# Patient Record
Sex: MALE | Race: WHITE | NOT HISPANIC OR LATINO | Employment: FULL TIME | URBAN - METROPOLITAN AREA
[De-identification: names, ages, dates, MRNs, and addresses within clinical notes are randomized per-mention and may not be internally consistent; named-entity substitution may affect disease eponyms.]

---

## 2017-03-28 ENCOUNTER — OFFICE VISIT (OUTPATIENT)
Dept: SPORTS MEDICINE | Facility: CLINIC | Age: 47
End: 2017-03-28

## 2017-03-28 VITALS
WEIGHT: 231 LBS | HEART RATE: 56 BPM | DIASTOLIC BLOOD PRESSURE: 72 MMHG | HEIGHT: 74 IN | BODY MASS INDEX: 29.65 KG/M2 | OXYGEN SATURATION: 98 % | SYSTOLIC BLOOD PRESSURE: 118 MMHG

## 2017-03-28 DIAGNOSIS — F41.9 ANXIETY: Primary | ICD-10-CM

## 2017-03-28 PROCEDURE — 99212 OFFICE O/P EST SF 10 MIN: CPT | Performed by: FAMILY MEDICINE

## 2017-03-28 RX ORDER — CITALOPRAM 40 MG/1
40 TABLET ORAL DAILY
Qty: 30 TABLET | Refills: 2 | Status: SHIPPED | OUTPATIENT
Start: 2017-03-28 | End: 2017-06-30 | Stop reason: SDUPTHER

## 2017-03-28 NOTE — PROGRESS NOTES
"Rickey is a 47 y.o. year old male    Chief Complaint   Patient presents with   • Anxiety     Pt is here to f/u       History of Present Illness   HPI   Here to f/u on anxiety. Celexa is helping but caused some somnolence and sexual dysfunction. Has helped but not enough to be really therapeutic. Continues with generalized anxiety, difficulty responding to stressful situations.     Only used a few buspar    Notes bad SE with wellbutrin in past    I have reviewed the patient's medical history in detail and updated the computerized patient record.    Review of Systems   Constitutional: Negative.        /72  Pulse 56  Ht 74\" (188 cm)  Wt 231 lb (105 kg)  SpO2 98%  BMI 29.66 kg/m2     Physical Exam   Constitutional: He appears well-developed and well-nourished.   Pulmonary/Chest: Effort normal.   Psychiatric: He has a normal mood and affect. His behavior is normal. Judgment and thought content normal.   Vitals reviewed.       Diagnoses and all orders for this visit:    Anxiety  -     citalopram (CeleXA) 40 MG tablet; Take 1 tablet by mouth Daily.  Increase to 40mg. F/u in phone or electronically for status update in 3-4 weeks. If not improving or worsened SE will change, consider SNRI.   "

## 2017-04-28 ENCOUNTER — OFFICE VISIT (OUTPATIENT)
Dept: SPORTS MEDICINE | Facility: CLINIC | Age: 47
End: 2017-04-28

## 2017-04-28 VITALS
DIASTOLIC BLOOD PRESSURE: 74 MMHG | BODY MASS INDEX: 29.65 KG/M2 | OXYGEN SATURATION: 99 % | SYSTOLIC BLOOD PRESSURE: 116 MMHG | HEIGHT: 74 IN | WEIGHT: 231 LBS | HEART RATE: 57 BPM

## 2017-04-28 DIAGNOSIS — L08.9 SKIN INFECTION: ICD-10-CM

## 2017-04-28 DIAGNOSIS — L30.9 DERMATITIS: Primary | ICD-10-CM

## 2017-04-28 DIAGNOSIS — L08.89 PITTED KERATOLYSIS: ICD-10-CM

## 2017-04-28 PROCEDURE — 99214 OFFICE O/P EST MOD 30 MIN: CPT | Performed by: FAMILY MEDICINE

## 2017-04-28 RX ORDER — HYDROXYZINE HYDROCHLORIDE 10 MG/1
10 TABLET, FILM COATED ORAL 3 TIMES DAILY PRN
Qty: 30 TABLET | Refills: 0 | Status: SHIPPED | OUTPATIENT
Start: 2017-04-28 | End: 2017-05-15 | Stop reason: SDUPTHER

## 2017-04-28 RX ORDER — PREDNISONE 10 MG/1
TABLET ORAL
Qty: 48 TABLET | Refills: 0 | Status: SHIPPED | OUTPATIENT
Start: 2017-04-28 | End: 2017-10-04

## 2017-04-28 RX ORDER — ERYTHROMYCIN 333 MG/1
333 TABLET, DELAYED RELEASE ORAL 4 TIMES DAILY
Qty: 30 TABLET | Refills: 0 | Status: CANCELLED | OUTPATIENT
Start: 2017-04-28

## 2017-04-28 RX ORDER — ERYTHROMYCIN 20 MG/ML
SOLUTION TOPICAL 2 TIMES DAILY
Qty: 60 ML | Refills: 1 | Status: SHIPPED | OUTPATIENT
Start: 2017-04-28 | End: 2017-10-04

## 2017-04-28 NOTE — PROGRESS NOTES
"Jcarlos Bedoya is a 47 y.o. male.   Chief Complaint   Patient presents with   • Right Foot - Blister, Pain   • Left Foot - Blister, Pain       History of Present Illness   2 weeks ago patient noted very pruritic rash on the palms of his hands, patient has an allergy to dogs of which he has for at home.  He is receiving allergy injections now.  The pruritic rash continued on his palms and then began to note an area on both feet on the sole of his feet in the metatarsal region a very red raised pruritic lesion bilaterally.  This has progressed to involve the entire sole of his feet.  Also has had bullous lesions on his feet.  The main complaint of the rash is being very pruritic and both the hands and the feet however is now on again to get blisters on the soles of his feet.  No fever or chills.  Patient has been using topical antifungals, powders, Neosporin and \"nothing is helping\".      I have reviewed the patient's medical history in detail and updated the computerized patient record.        Review of Systems   Constitutional: Negative.    HENT: Negative.    Respiratory: Negative.    Cardiovascular: Negative.    Gastrointestinal: Negative.    Skin: Positive for rash.     /74  Pulse 57  Ht 74\" (188 cm)  Wt 231 lb (105 kg)  SpO2 99%  BMI 29.66 kg/m2    Objective   Physical Exam   Constitutional: He is oriented to person, place, and time. He appears well-developed and well-nourished.   HENT:   Head: Normocephalic and atraumatic.   Eyes: Conjunctivae and EOM are normal. Pupils are equal, round, and reactive to light.   Cardiovascular:   No peripheral edema   Pulmonary/Chest: Effort normal.   Neurological: He is alert and oriented to person, place, and time.   Skin: Skin is warm and dry.   Both palms with evidence of small raised red papules, there is no bullous lesions.  No evidence of secondary infection.  Right foot with similar findings to the palm however there is some pitted keratolysis " present as well, no bullous lesions on the right foot.  Left foot seems more involved and there are several bullous lesions of clear fluid around the toes.   Psychiatric: He has a normal mood and affect. His behavior is normal.   Vitals reviewed.      Assessment/Plan   Rickey was seen today for blister, pain, blister and pain.    Diagnoses and all orders for this visit:    Dermatitis  -     predniSONE (DELTASONE) 10 MG tablet; 6 tabs qd x 3 d, 4 tabs qd x 3 d, 3 tabs qd x 3 d, 2 tabs qd x 3 d,1 tab qd x 3 d  -     hydrOXYzine (ATARAX) 10 MG tablet; Take 1 tablet by mouth 3 (Three) Times a Day As Needed for Itching.    Skin infection  -     predniSONE (DELTASONE) 10 MG tablet; 6 tabs qd x 3 d, 4 tabs qd x 3 d, 3 tabs qd x 3 d, 2 tabs qd x 3 d,1 tab qd x 3 d  -     hydrOXYzine (ATARAX) 10 MG tablet; Take 1 tablet by mouth 3 (Three) Times a Day As Needed for Itching.    Pitted keratolysis  -     erythromycin with ethanol (THERAMYCIN) 2 % external solution; Apply  topically 2 (Two) Times a Day.    Other orders  -     Cancel: erythromycin (WALI-TAB) 333 MG EC tablet; Take 1 tablet by mouth 4 (Four) Times a Day.      I believe this is something that started as a contact dermatitis on his hands that eventually spread to the soles of his feet however the plantar surfaces now appear to have either a bullous dermatophyte are.  Care to lysis.  He has been treating with topical antifungals without success.  We will therefore treat the contact dermatitis with tapering prednisone as well as hydroxyzine for the itching.  We'll have him apply topical erythromycin (oral erythromycin would not be indicated with him being on Celexa).  If he is not seeing any improvement over the next 3-4  days then will refer to dermatology.    25 minutes were spent face-to-face with the patient with greater than 50% of that time spent counseling the patient.

## 2017-05-15 DIAGNOSIS — L08.89 PITTED KERATOLYSIS: ICD-10-CM

## 2017-05-15 DIAGNOSIS — L30.9 DERMATITIS: Primary | ICD-10-CM

## 2017-05-15 DIAGNOSIS — L08.9 SKIN INFECTION: ICD-10-CM

## 2017-05-15 RX ORDER — HYDROXYZINE HYDROCHLORIDE 10 MG/1
10 TABLET, FILM COATED ORAL 3 TIMES DAILY PRN
Qty: 30 TABLET | Refills: 0 | Status: SHIPPED | OUTPATIENT
Start: 2017-05-15 | End: 2017-10-04

## 2017-06-30 ENCOUNTER — TELEPHONE (OUTPATIENT)
Dept: SPORTS MEDICINE | Facility: CLINIC | Age: 47
End: 2017-06-30

## 2017-06-30 DIAGNOSIS — F41.9 ANXIETY: ICD-10-CM

## 2017-06-30 RX ORDER — CITALOPRAM 40 MG/1
40 TABLET ORAL DAILY
Qty: 30 TABLET | Refills: 3 | Status: SHIPPED | OUTPATIENT
Start: 2017-06-30 | End: 2017-10-04

## 2017-10-04 ENCOUNTER — OFFICE VISIT (OUTPATIENT)
Dept: SPORTS MEDICINE | Facility: CLINIC | Age: 47
End: 2017-10-04

## 2017-10-04 VITALS
WEIGHT: 264 LBS | SYSTOLIC BLOOD PRESSURE: 124 MMHG | OXYGEN SATURATION: 98 % | HEIGHT: 74 IN | DIASTOLIC BLOOD PRESSURE: 76 MMHG | HEART RATE: 52 BPM | BODY MASS INDEX: 33.88 KG/M2

## 2017-10-04 DIAGNOSIS — R42 VERTIGO: Primary | ICD-10-CM

## 2017-10-04 DIAGNOSIS — R51.9 NONINTRACTABLE HEADACHE, UNSPECIFIED CHRONICITY PATTERN, UNSPECIFIED HEADACHE TYPE: ICD-10-CM

## 2017-10-04 DIAGNOSIS — H61.23 BILATERAL IMPACTED CERUMEN: ICD-10-CM

## 2017-10-04 PROCEDURE — 99214 OFFICE O/P EST MOD 30 MIN: CPT | Performed by: FAMILY MEDICINE

## 2017-10-04 PROCEDURE — 69209 REMOVE IMPACTED EAR WAX UNI: CPT | Performed by: FAMILY MEDICINE

## 2017-10-04 NOTE — PROGRESS NOTES
"Rickey is a 47 y.o. year old male    Chief Complaint   Patient presents with   • Pressure Behind the Eyes     Pt sts he can turn his head and get  dizzy    • Headache   • Fatigue       History of Present Illness   HPI   Here today for vague discomfort in the head, intermittent, started about 2 weeks ago.  No inciting event.  Notes a generalized pressure behind the eyes, also sometimes associated with dizzy feelings that resolved spontaneously.  He also has odd \"zinger\" sensations to the arms and legs that come and go.      After this started he stopped his Celexa abruptly without tapering, but it did not change his symptoms.  Stopped because he felt like it was not helping his anxiety, but BuSpar is still helpful as needed.      I have reviewed the patient's medical, family, and social history in detail and updated the computerized patient record.    Review of Systems   Constitutional: Negative for chills and fever.   HENT: Negative for congestion and tinnitus.    Eyes: Negative for photophobia and visual disturbance.   Respiratory: Negative.    Neurological: Positive for dizziness.       /76  Pulse 52  Ht 74\" (188 cm)  Wt 264 lb (120 kg)  SpO2 98%  BMI 33.9 kg/m2     Physical Exam   Constitutional: He is oriented to person, place, and time. He appears well-developed and well-nourished.   HENT:   Head: Normocephalic and atraumatic.   Right Ear: External ear normal.   Left Ear: External ear normal.   Mouth/Throat: Oropharynx is clear and moist. No oropharyngeal exudate.   Cerumen impaction obstructing visualization of the tympanic membrane and middle ear   Eyes: Conjunctivae are normal. Pupils are equal, round, and reactive to light.   Neck: Normal range of motion. Neck supple. No thyromegaly present.   Cardiovascular: Normal rate, regular rhythm and normal heart sounds.    Pulmonary/Chest: Effort normal and breath sounds normal.   Lymphadenopathy:     He has no cervical adenopathy.   Neurological: He is " alert and oriented to person, place, and time. No cranial nerve deficit. He exhibits normal muscle tone. Coordination normal.   Negative Weiser-Hallpike maneuver   Skin: Skin is warm and dry.   Psychiatric: He has a normal mood and affect. His behavior is normal. Judgment and thought content normal.   Vitals reviewed.    bilateral ear(s) irrigated by medical assistant due to impacted cerumen in the ear canal. This was indicated due to obstruction of visualization for complete exam.  After irrigation, bilateral tympanic membranes were easily visualized and normal.     Diagnoses and all orders for this visit:    Vertigo  -     Ambulatory Referral to Physical Therapy Vestibular    Bilateral impacted cerumen    Nonintractable headache, unspecified chronicity pattern, unspecified headache type    Underlying root cause here is rather unclear.  This could be intermittent vertigo or a typical migraine pattern.  We'll start with vestibular therapy to try to clarify the pattern, will determine next steps in evaluation and management based on his response there.    EMR Dragon/Transcription disclaimer:    Much of this encounter note is an electronic transcription/translation of spoken language to printed text.  The electronic translation of spoken language may permit erroneous, or at times, nonsensical words or phrases to be inadvertently transcribed.  Although I have reviewed the note for such errors some may still exist.

## 2018-01-10 DIAGNOSIS — R91.8 ABNORMAL CT SCAN OF LUNG: Primary | ICD-10-CM

## 2018-01-11 ENCOUNTER — TELEPHONE (OUTPATIENT)
Dept: SPORTS MEDICINE | Facility: CLINIC | Age: 48
End: 2018-01-11

## 2018-01-11 NOTE — TELEPHONE ENCOUNTER
He can decline it if he wants; last year we did a scan and the radiologist recommended a follow up on what appeared to be a scar to make sure that's all it is. He can wait until his next physical to discuss if he wants.

## 2018-01-13 ENCOUNTER — APPOINTMENT (OUTPATIENT)
Dept: GENERAL RADIOLOGY | Facility: HOSPITAL | Age: 48
End: 2018-01-13

## 2018-01-13 PROCEDURE — 72100 X-RAY EXAM L-S SPINE 2/3 VWS: CPT | Performed by: FAMILY MEDICINE

## 2018-01-13 PROCEDURE — 72220 X-RAY EXAM SACRUM TAILBONE: CPT | Performed by: FAMILY MEDICINE

## 2018-01-13 PROCEDURE — 71101 X-RAY EXAM UNILAT RIBS/CHEST: CPT | Performed by: FAMILY MEDICINE

## 2018-02-13 ENCOUNTER — TELEPHONE (OUTPATIENT)
Dept: ORTHOPEDIC SURGERY | Facility: CLINIC | Age: 48
End: 2018-02-13

## 2018-03-05 ENCOUNTER — OFFICE VISIT (OUTPATIENT)
Dept: ORTHOPEDIC SURGERY | Facility: CLINIC | Age: 48
End: 2018-03-05

## 2018-03-05 VITALS — TEMPERATURE: 97.7 F | BODY MASS INDEX: 33.11 KG/M2 | HEIGHT: 74 IN | WEIGHT: 258 LBS

## 2018-03-05 DIAGNOSIS — M79.671 FOOT PAIN, RIGHT: Primary | ICD-10-CM

## 2018-03-05 DIAGNOSIS — R22.41 MASS OF RIGHT FOOT: ICD-10-CM

## 2018-03-05 PROCEDURE — 73630 X-RAY EXAM OF FOOT: CPT | Performed by: ORTHOPAEDIC SURGERY

## 2018-03-05 PROCEDURE — 99203 OFFICE O/P NEW LOW 30 MIN: CPT | Performed by: ORTHOPAEDIC SURGERY

## 2018-03-05 NOTE — PROGRESS NOTES
"Patient:  Rickey Bedoya is a 48 y.o. male    Chief Complaint/ Reason for Visit:    Chief Complaint   Patient presents with   • Right Foot - Establish Care, Pain, Mass       HPI:  This pleasant gentleman presents today complaining of a 4 month history of pain associated with a mass in the plantar aspect of his right forefoot.  He says that he was being treated for \"dystrophic eczema\" by Dr. Jason Singh, his dermatologist, and he was developing recurrent severe blistering of both of his feet.  He says that in late August or early November of last year, he had tremendous swelling in both feet associated with the skin problems.  The right foot swelled more than the left.  When the swelling went down, he noticed this \"knot\" beneath his right forefoot adjacent to the second metatarsal head.  He says he in the ensuing months, he has noted a divergence between his right second and third toes that he associates with the appearance of this \"knot\".    The knot is painful and tender to walk on.  The pain is aching occasionally sharp.  The pain is typically mild.  It's alleviated by rest and elevation.  He has not had any calf pain, chest pain, or shortness of breath.      PMH:  History reviewed. No pertinent past medical history.    PSH:    Past Surgical History:   Procedure Laterality Date   • APPENDECTOMY         Social Hx:    Social History     Social History   • Marital status:      Spouse name: N/A   • Number of children: N/A   • Years of education: N/A     Occupational History   • Not on file.     Social History Main Topics   • Smoking status: Never Smoker   • Smokeless tobacco: Never Used   • Alcohol use No   • Drug use: Defer   • Sexual activity: Defer     Other Topics Concern   • Not on file     Social History Narrative       Family Hx:    Family History   Problem Relation Age of Onset   • COPD Mother    • Heart failure Mother    • Diabetes Father    • Heart failure Father    • Seizures Father    • No Known " "Problems Son    • No Known Problems Daughter        Meds:    Current Outpatient Prescriptions:   •  albuterol (PROVENTIL HFA;VENTOLIN HFA) 108 (90 BASE) MCG/ACT inhaler, Inhale 2 puffs Every 4 (Four) Hours As Needed for wheezing., Disp: 1 inhaler, Rfl: 2  •  busPIRone (BUSPAR) 10 MG tablet, Take 1 tablet by mouth 2 (Two) Times a Day. Prn anxiety, Disp: 30 tablet, Rfl: 1  •  diclofenac (VOLTAREN) 50 MG EC tablet, Take 1 tablet by mouth 2 (Two) Times a Day As Needed (pain, take with food)., Disp: 30 tablet, Rfl: 0  •  HYDROcod Polst-CPM Polst ER (TUSSIONEX PENNKINETIC ER) 10-8 MG/5ML ER suspension, Take 5 mL by mouth Every 12 (Twelve) Hours As Needed for Cough., Disp: 115 mL, Rfl: 0  •  tiZANidine (ZANAFLEX) 4 MG tablet, Take 1 tablet by mouth Every 8 (Eight) Hours As Needed for Muscle Spasms., Disp: 30 tablet, Rfl: 0    Allergies:  No Known Allergies    ROS:  Review of Systems   Musculoskeletal: Positive for myalgias.   All other systems reviewed and are negative.      Vitals:    03/05/18 0831   Temp: 97.7 °F (36.5 °C)   TempSrc: Temporal Artery    Weight: 117 kg (258 lb)   Height: 186.7 cm (73.5\")     Body mass index is 33.58 kg/(m^2).    Physical Exam    The patient is awake, alert, and oriented ×3.  The patient is in no acute distress.  Breathing is regular and unlabored with a respiratory rate of 14/m.  Extraocular movements and pupillary responses are symmetrically intact. Sclerae are anicteric.   Hearing is within normal limits.  Speech is within normal limits.  There is no jugular venous distention.    The patient has a fairly smooth, symmetrical heel toe gait with perhaps just the slightest antalgia from the right.  Shortening of the stance phase is noted.    Right foot: There is no obvious atrophy of the right lower extremity musculature.  The right calf is soft and nontender with no venous cord.  There is no cellulitis, no lymphangitis, and no popliteal lymphadenopathy.  The patient has palpable regular " pedal pulses with a current heart rate of about 72 bpm.  When he stands, there is a noticeable divergence between the right second and third toes that is not present in the left foot.  Justin's click is negative.  Sensory exam is intact with perhaps slightly decreased to light touch in the right forefoot second webspace.  Capillary filling is brisk in all toes.  Active flexion and extension of the toes is intact.  The patient has no weakness or muscular atrophy that I can detect on examination at this time.        Radiology:X-rays: 3 views of the patient's right foot were ordered and reviewed today to assess his complaints of a tender painful mass and swelling in his right forefoot.  Comparison images were not available.  These images do not reveal any obvious acute osseous or articular abnormalities.  On the AP view, I do see what may be a very subtle soft tissue density plantar to the second metatarsal head.  Incidental note is made of a mild to moderate hallux valgus deformity.        Assessment:     Diagnosis Plan   1. Foot pain, right  XR Foot 3+ View Right    MRI Foot Right Without Contrast   2. Mass of right foot  MRI Foot Right Without Contrast           Plan:  I discussed everything with the patient at length.  I explained that there was no way of knowing if there was actually any relationship between the macerated develop in his foot and knee dermatologic disorder from which she had been suffering.  However, we need to obtain an MRI promptly to evaluate the nature of this mass.  Patient voiced understanding and agreement, and I instructed him to make a follow-up appointment within 3-5 days of the date of his MRI, as soon as he knew this date.  He voiced understanding and agreement.      Orders Placed This Encounter   Procedures   • XR Foot 3+ View Right     Order Specific Question:   Reason for Exam:     Answer:   iov rt. foot   • MRI Foot Right Without Contrast     Standing Status:   Future     Standing  Expiration Date:   3/5/2019

## 2018-03-05 NOTE — PATIENT INSTRUCTIONS
Please remember to schedule an appointment with me as soon as you know the date of your MRI.  This appointment should be 3-5 working days after the date of your MRI.  Thank you

## 2018-03-15 ENCOUNTER — HOSPITAL ENCOUNTER (OUTPATIENT)
Dept: MRI IMAGING | Facility: HOSPITAL | Age: 48
Discharge: HOME | End: 2018-03-15
Attending: ORTHOPAEDIC SURGERY | Admitting: ORTHOPAEDIC SURGERY

## 2018-03-15 DIAGNOSIS — M79.671 FOOT PAIN, RIGHT: ICD-10-CM

## 2018-03-15 DIAGNOSIS — R22.41 MASS OF RIGHT FOOT: ICD-10-CM

## 2018-03-15 PROCEDURE — 73718 MRI LOWER EXTREMITY W/O DYE: CPT

## 2018-03-20 ENCOUNTER — OFFICE VISIT (OUTPATIENT)
Dept: ORTHOPEDIC SURGERY | Facility: CLINIC | Age: 48
End: 2018-03-20

## 2018-03-20 VITALS — TEMPERATURE: 98.6 F | WEIGHT: 255 LBS | BODY MASS INDEX: 32.73 KG/M2 | HEIGHT: 74 IN

## 2018-03-20 DIAGNOSIS — R22.41 MASS OF RIGHT FOOT: ICD-10-CM

## 2018-03-20 DIAGNOSIS — M79.671 FOOT PAIN, RIGHT: ICD-10-CM

## 2018-03-20 DIAGNOSIS — M71.371 OTHER BURSAL CYST, RIGHT ANKLE AND FOOT: Primary | ICD-10-CM

## 2018-03-20 PROCEDURE — 99214 OFFICE O/P EST MOD 30 MIN: CPT | Performed by: ORTHOPAEDIC SURGERY

## 2018-03-20 NOTE — PROGRESS NOTES
"Patient:  Rickey Bedoya is a 48 y.o. male    Chief Complaint/ Reason for Visit:    Chief Complaint   Patient presents with   • Right Foot - Follow-up, Results, Pain       HPI:  Patient returns today and is unchanged with respect to his right foot.  He has pain in the plantar aspect of the forefoot and pain radiating out into his second and third toes which have been diverging over the past few months due to what is believed to be a mass effect.  He also has numbness after prolonged standing and walking.  He says on the one hand it's not anything that he can't live with, but on the other hand, he would rather not live with that if he doesn't have to.  He says that if he wants to walk his dog for a mile, he will have considerable discomfort and numbness and dysesthesias in his right forefoot, especially in the second and third toes.  However, for the most part, his history of present illness has not changed from the previous visit.  Therefore, for review, this is summarized below:     This pleasant gentleman presents today complaining of a 4 month history of pain associated with a mass in the plantar aspect of his right forefoot.  He says that he was being treated for \"dystrophic eczema\" by Dr. Jason Singh, his dermatologist, and he was developing recurrent severe blistering of both of his feet.  He says that in late August or early November of last year, he had tremendous swelling in both feet associated with the skin problems.  The right foot swelled more than the left.  When the swelling went down, he noticed this \"knot\" beneath his right forefoot adjacent to the second metatarsal head.  He says he in the ensuing months, he has noted a divergence between his right second and third toes that he associates with the appearance of this \"knot\".     The knot is painful and tender to walk on.  The pain is aching occasionally sharp.  The pain is typically mild.  It's alleviated by rest and elevation.  He has not had any " "calf pain, chest pain, or shortness of breath.         PMH:  History reviewed. No pertinent past medical history.    PSH:    Past Surgical History:   Procedure Laterality Date   • APPENDECTOMY         Social Hx:    Social History     Social History   • Marital status:      Spouse name: N/A   • Number of children: N/A   • Years of education: N/A     Occupational History   • Not on file.     Social History Main Topics   • Smoking status: Never Smoker   • Smokeless tobacco: Never Used   • Alcohol use No   • Drug use: Unknown   • Sexual activity: Defer     Other Topics Concern   • Not on file     Social History Narrative   • No narrative on file       Family Hx:    Family History   Problem Relation Age of Onset   • COPD Mother    • Heart failure Mother    • Diabetes Father    • Heart failure Father    • Seizures Father    • No Known Problems Son    • No Known Problems Daughter        Meds:  No current outpatient prescriptions on file.    Allergies:  No Known Allergies    ROS:  Review of Systems   Musculoskeletal: Positive for arthralgias, gait problem and joint swelling.   All other systems reviewed and are negative.      Vitals:    03/20/18 0820   Temp: 98.6 °F (37 °C)   TempSrc: Temporal Artery    Weight: 116 kg (255 lb)   Height: 186.7 cm (73.5\")     Body mass index is 33.19 kg/m².    Physical Exam    The patient is awake, alert, and oriented ×3.  The patient is in no acute distress.  Breathing is regular and unlabored with a respiratory rate of 12/m.  Extraocular movements and pupillary responses are symmetrically intact. Sclerae are anicteric.   Hearing is within normal limits.  Speech is within normal limits.  There is no jugular venous distention.    The patient has a fairly smooth, symmetrical heel toe gait, however I do notice that he has some antalgia from the right.  Shortening of the stance phase is noted.     Right foot: There is no obvious atrophy of the right lower extremity musculature.  The right " calf is soft and nontender with no venous cord.  There is no cellulitis, no lymphangitis, and no popliteal lymphadenopathy.  The patient has palpable regular pedal pulses with a current heart rate of about 72 bpm.  When he stands, there is a noticeable divergence between the right second and third toes that is not present in the left foot.  Justin's click is negative.  Sensory exam is intact with perhaps slightly decreased to light touch in the right forefoot second webspace.  Capillary filling is brisk in all toes.  Active flexion and extension of the toes is intact.  The patient has no weakness or muscular atrophy that I can detect on examination at this time.  When I push on the plantar masslike area beneath the second metatarsal head, it actually seems to increase the swelling and fullness between the second and third toes.  As I examined this area, the patient reported that he was developing more of a numb feeling between the second and third toes.        Radiology: MRI right foot: I reviewed the MRI from March 15, both the images and the report.  My findings are essentially the same as those of the interpreting radiologist.  The patient has an unusual and diffuse cyst that seems to travel towards the first metatarsal from the plantar aspect of the second metatarsal phalangeal joint and also travels laterally up into the second interspace likely causing the divergence of the second and third toes.          Assessment:     Diagnosis Plan   1. Other bursal cyst, right ankle and foot  Ambulatory Referral to Orthopedic Surgery   2. Mass of right foot  Ambulatory Referral to Orthopedic Surgery   3. Foot pain, right  Ambulatory Referral to Orthopedic Surgery           Plan:  I discussed everything with the patient at length, and in great detail.  I explained my concerns regarding this unusual cyst.  I explained that there was no way that I could tell him whether there was any direct association with the dermatologic  disorder that he had previously, but that I doubted the acute situation on the surface of his skin caused this cystic situation in his right forefoot, no matter how unusual they most may be.  However, I did explain that, at some level, he may have a connective tissue disorder, and inflammatory disorder, or some other systemic disorder that would be the basis for both the skin problems as well as this cyst.  I explained that incisions should essentially never be made on the bottom of the foot.  I explained that, therefore, complete excision of this cyst from a dorsal approach would not be possible.  I explained that cysts in the foot had at least a 20%, or one and 5, recurrence rate, even when completely surgically excised.  I also explained that I felt we should probably consider excising the portion in the second webspace that was causing the divergence of his toes.  I explained that this may or may not improve his overall symptomatology.  I explained that by opening the cyst up, it may result in draining and ablation of the cyst long-term.  I also explained that we could certainly take a noninvasive path of observation.  Given the unusual nature of the cyst, however, I think that partial excision would also function as an open biopsy.    We also discussed the possibility of percutaneously aspirating the cyst, though my confidence is not great that this will likely be successful or very helpful given the unusual nature of these circumstances.    I think that it would be worthwhile to obtain an independent evaluation/second opinion from Dr. Lemon.  The patient was agreeable.    I will see him back once he has received Dr. Lemon's assessment and recommendations.    I spent 25 minutes, at least, in the evaluation and management of this patient today, including review of the MRI images, as well as the report, personally.  I spent 15 minutes with the patient explaining the findings, and discussing the options  and going over her decision-making process with him.      Orders Placed This Encounter   Procedures   • Ambulatory Referral to Orthopedic Surgery     Referral Priority:   Routine     Referral Type:   Consultation     Referral Reason:   Second Opinion     Referred to Provider:   Dakota Lemon MD     Requested Specialty:   Orthopedic Surgery     Number of Visits Requested:   1

## 2018-04-11 ENCOUNTER — CONSULT (OUTPATIENT)
Dept: ORTHOPEDIC SURGERY | Facility: CLINIC | Age: 48
End: 2018-04-11

## 2018-04-11 ENCOUNTER — TELEPHONE (OUTPATIENT)
Dept: ORTHOPEDIC SURGERY | Facility: CLINIC | Age: 48
End: 2018-04-11

## 2018-04-11 VITALS — HEIGHT: 74 IN | WEIGHT: 252.4 LBS | TEMPERATURE: 97.6 F | BODY MASS INDEX: 32.39 KG/M2

## 2018-04-11 DIAGNOSIS — R22.41 MASS OF RIGHT FOOT: ICD-10-CM

## 2018-04-11 DIAGNOSIS — M71.371 OTHER BURSAL CYST, RIGHT ANKLE AND FOOT: Primary | ICD-10-CM

## 2018-04-11 DIAGNOSIS — M79.671 FOOT PAIN, RIGHT: ICD-10-CM

## 2018-04-11 DIAGNOSIS — R22.41 MASS OF RIGHT FOOT: Primary | ICD-10-CM

## 2018-04-11 PROCEDURE — 99244 OFF/OP CNSLTJ NEW/EST MOD 40: CPT | Performed by: ORTHOPAEDIC SURGERY

## 2018-04-11 NOTE — TELEPHONE ENCOUNTER
I called the patient to discuss his visit with Dr. Lemon, and discussed ongoing options for treatment of the mass in his foot.  I spent 8-1/2 minutes on the phone with the patient.    Ultimately, he agreed that he would like an opinion and possibly treatment from Dr. Shawn Perla.  He understands that Dr. Peral is not in the [facility] system, but we also reviewed that there really was no other musculoskeletal tumor specialist in the area, and certainly not one directly affiliated with [facility].    I told him that our office would facilitate the arrangements, and that he need not keep the appointment with me next week.

## 2018-04-11 NOTE — PROGRESS NOTES
New Patient Complaint      Patient: Rickey Bedoya  YOB: 1970 48 y.o. male  Medical Record Number: [id number]    Chief Complaints: My foot hurts     History of Present Illness: Patient reports a 4-5 month history of painful mass under the right forefoot mainly beneath the second MTP joint.    He began treatment last April for dystrophic eczema, with multiple episodes where the plantar aspect of the foot but breakout and blister and then heel.  In the fall of last year he had tremendous swelling to the right foot that is now resolved and he noticed in February of this over this year that he had painful mass under the plantar aspect of the right forefoot with some splaying of the second and third toes.  Painful symptoms have escalated some since he saw Dr. Stoll.    He complains of significant increased moderate aching pain with any prolonged walking or barefoot ambulation.  He states that it is affecting the quality of his life and it is something that he would like to have something done with it if at all possible.    He is been followed by my partner Dr. Stoll for this who has requested my opinion regarding etiology and treatment of this condition.        HPI    Allergies: No Known Allergies    Medications:   No current outpatient prescriptions on file prior to visit.     No current facility-administered medications on file prior to visit.        History reviewed. No pertinent past medical history.  Past Surgical History:   Procedure Laterality Date   • APPENDECTOMY       Social History     Occupational History   • Not on file.     Social History Main Topics   • Smoking status: Never Smoker   • Smokeless tobacco: Never Used   • Alcohol use No   • Drug use: Unknown   • Sexual activity: Defer      Social History     Social History Narrative   • No narrative on file     Family History   Problem Relation Age of Onset   • COPD Mother    • Heart failure Mother    • Diabetes Father    • Heart failure  "Father    • Seizures Father    • No Known Problems Son    • No Known Problems Daughter        Review of Systems: 14 point review of systems performed, positive pertinent findings identified in HPI. All remaining systems negative     Review of Systems      Physical Exam:   Vitals:    04/11/18 0811   Temp: 97.6 °F (36.4 °C)   TempSrc: Temporal Artery    Weight: 114 kg (252 lb 6.4 oz)   Height: 188 cm (74\")     Physical Exam   Constitutional: pleasant, well developed   Eyes: sclera non icteric  Hearing : adequate for exam  Cardiovascular: palpable pulses in right foot, right calf/ thigh NT without sign of DVT  Respiratoy: breathing unlabored   Neurological: grossly sensate to LT throughout right LE  Psychiatric: oriented with normal mood and affect.   Lymphatic: No palpable popliteal lymphadenopathy right LE  Skin: intact throughout right leg/foot  Musculoskeletal: Right foot shows skin to be intact with no warmth or erythema.  There is some fullness plantar to the second metatarsal head which is somewhat tender to palpation.  With plantar applied pressure this does give some discomfort in the second more so than first webspace.  There was no gross irritability or instability to the second or third MTP joint.  On standing there was some splaying of the second and third toes.  I was unable to elicit any clear neuritic symptoms today in the second webspace  Physical Exam  Ortho Exam    Radiology: 3 views of the right foot were reviewed from 3/5/18 and do not see any evidence of any lytic lesion there is some slight splaying of the second and third toes.    MRI films and report of the right foot dated 3/15/18 reviewed which show a superficial cystic lesion plantar to the second MTP joint contiguous with a smaller cystic lesion within the second interspace also some projection plantar medially to the first interspace with some low T2 signal which may represent some internal debris and synovial thickening or hemorrhagic " material thought to be a synovial or ganglion cyst or possible adventitial bursitis        Assessment/Plan: Right foot plantar mass.    I discussed at length with patient that this is a very difficult problem difficult to say if it is directly related to his dermatologic condition that is certainly think is a high likelihood that it could've been related as the symptoms began after a lesion to that area I do not see any sign of infection at this time.    Full resection would be very difficult through a dorsal approach and I would be reluctant to perform any extensive plantar incision as I do not have significant experience with that.    Fitted him with a metatarsal pad today and we'll have him follow-up with Dr. Stoll.  Additional treatment recommendations and considerations could include possible attempted aspiration and steroid injection or possible referral to Dr. Shawn Perla who is a musculoskeletal tumor specialist and/or Dr. Carlos Eduardo Mack who is a foot and ankle colleagues may have different experience with such masses.  Patient voiced clear understanding of this treatment plan and will follow-up with Dr. Stoll

## 2019-06-28 ENCOUNTER — OFFICE VISIT (OUTPATIENT)
Dept: SPORTS MEDICINE | Facility: CLINIC | Age: 49
End: 2019-06-28

## 2019-06-28 VITALS
SYSTOLIC BLOOD PRESSURE: 138 MMHG | OXYGEN SATURATION: 95 % | HEART RATE: 92 BPM | WEIGHT: 250 LBS | DIASTOLIC BLOOD PRESSURE: 80 MMHG | BODY MASS INDEX: 32.08 KG/M2 | HEIGHT: 74 IN

## 2019-06-28 DIAGNOSIS — F41.9 ANXIETY: Primary | ICD-10-CM

## 2019-06-28 PROCEDURE — 99214 OFFICE O/P EST MOD 30 MIN: CPT | Performed by: FAMILY MEDICINE

## 2019-06-28 RX ORDER — CITALOPRAM 20 MG/1
20 TABLET ORAL DAILY
Qty: 30 TABLET | Refills: 1 | Status: SHIPPED | OUTPATIENT
Start: 2019-06-28 | End: 2019-08-02 | Stop reason: SDUPTHER

## 2019-06-28 RX ORDER — BUSPIRONE HYDROCHLORIDE 15 MG/1
15 TABLET ORAL 3 TIMES DAILY PRN
Qty: 30 TABLET | Refills: 1 | Status: SHIPPED | OUTPATIENT
Start: 2019-06-28 | End: 2019-08-02 | Stop reason: SDUPTHER

## 2019-06-28 NOTE — PROGRESS NOTES
"Rickey is a 49 y.o. year old male follow up on a problem familiar to this examiner.     Chief Complaint   Patient presents with   • Anxiety       History of Present Illness   HPI   F/u anxiety - since last visit in 2017 has been doing pretty well overall.   Celexa worked well but caused somnolence and sexual side effects.   Buspar worked well for intermittent episodes.   Notes over the past several months he recognizes increasing generalized anxiety symptoms. Notably losing his temper easily and feeling guilty afterwards. Associated with difficulty sleeping.     I have reviewed the patient's medical, family, and social history in detail and updated the computerized patient record.    Review of Systems   Constitutional: Negative.    Respiratory: Negative.    Cardiovascular: Negative.    Psychiatric/Behavioral: Positive for sleep disturbance. The patient is nervous/anxious.        /80 (BP Location: Left arm, Patient Position: Sitting, Cuff Size: Adult)   Pulse 92   Ht 188 cm (74.02\")   Wt 113 kg (250 lb)   SpO2 95%   BMI 32.08 kg/m²      Physical Exam   Constitutional: He is oriented to person, place, and time. He appears well-developed and well-nourished.   Pulmonary/Chest: Effort normal.   Neurological: He is alert and oriented to person, place, and time.   Psychiatric: He has a normal mood and affect. His behavior is normal.   Vitals reviewed.        Current Outpatient Medications:   •  fluticasone (FLONASE) 50 MCG/ACT nasal spray, Instill two sprays in each nostril daily., Disp: 16 g, Rfl: 0  •  busPIRone (BUSPAR) 15 MG tablet, Take 1 tablet by mouth 3 (Three) Times a Day As Needed (anxiety)., Disp: 30 tablet, Rfl: 1  •  citalopram (CELEXA) 20 MG tablet, Take 1 tablet by mouth Daily., Disp: 30 tablet, Rfl: 1     Diagnoses and all orders for this visit:    Anxiety  -     busPIRone (BUSPAR) 15 MG tablet; Take 1 tablet by mouth 3 (Three) Times a Day As Needed (anxiety).  -     citalopram (CELEXA) 20 MG " tablet; Take 1 tablet by mouth Daily.    Resume citalopram; may need to increase to 40mg. Update on progress in 4 weeks .         EMR Dragon/Transcription disclaimer:    Much of this encounter note is an electronic transcription/translation of spoken language to printed text.  The electronic translation of spoken language may permit erroneous, or at times, nonsensical words or phrases to be inadvertently transcribed.  Although I have reviewed the note for such errors some may still exist.

## 2019-08-02 DIAGNOSIS — F41.9 ANXIETY: ICD-10-CM

## 2019-08-02 RX ORDER — FLUTICASONE PROPIONATE 50 MCG
2 SPRAY, SUSPENSION (ML) NASAL DAILY
Qty: 1 BOTTLE | Refills: 11 | Status: SHIPPED | OUTPATIENT
Start: 2019-08-02 | End: 2020-04-02 | Stop reason: SDUPTHER

## 2019-08-02 RX ORDER — BUSPIRONE HYDROCHLORIDE 15 MG/1
15 TABLET ORAL 3 TIMES DAILY PRN
Qty: 30 TABLET | Refills: 1 | Status: SHIPPED | OUTPATIENT
Start: 2019-08-02

## 2019-08-02 RX ORDER — CITALOPRAM 20 MG/1
20 TABLET ORAL DAILY
Qty: 30 TABLET | Refills: 1 | Status: SHIPPED | OUTPATIENT
Start: 2019-08-02 | End: 2019-10-30 | Stop reason: SDUPTHER

## 2019-09-09 ENCOUNTER — OFFICE VISIT (OUTPATIENT)
Dept: SPORTS MEDICINE | Facility: CLINIC | Age: 49
End: 2019-09-09

## 2019-09-09 VITALS
WEIGHT: 250 LBS | HEIGHT: 74 IN | SYSTOLIC BLOOD PRESSURE: 124 MMHG | HEART RATE: 50 BPM | DIASTOLIC BLOOD PRESSURE: 64 MMHG | OXYGEN SATURATION: 97 % | BODY MASS INDEX: 32.08 KG/M2

## 2019-09-09 DIAGNOSIS — H53.9 VISUAL CHANGES: ICD-10-CM

## 2019-09-09 DIAGNOSIS — S81.802A LEG WOUND, LEFT, INITIAL ENCOUNTER: Primary | ICD-10-CM

## 2019-09-09 PROCEDURE — 99215 OFFICE O/P EST HI 40 MIN: CPT | Performed by: FAMILY MEDICINE

## 2019-09-09 PROCEDURE — 73590 X-RAY EXAM OF LOWER LEG: CPT | Performed by: FAMILY MEDICINE

## 2019-09-09 RX ORDER — CEPHALEXIN 500 MG/1
500 CAPSULE ORAL 2 TIMES DAILY
Qty: 20 CAPSULE | Refills: 0 | Status: SHIPPED | OUTPATIENT
Start: 2019-09-09 | End: 2019-10-24

## 2019-09-09 NOTE — PROGRESS NOTES
"Rickey is a 49 y.o. year old male evaluation of a problem that is new to this examiner.    Chief Complaint   Patient presents with   • LT lower leg injury     from yard work x 2 weeks        History of Present Illness   HPI   Injury to L lower leg 2 weeks ago - felt immed sharp pain while weedeating like something punctured the skin. He had severe sharp pain for a few days. He tried cleaning it out and probing for a foreign object (twice) but just had bleeding. Currently mild soreness. No associated symptoms.     Yesterday had floaters, again today - difficulty focusing with blurred vision. Today has a mild headache again.     I have reviewed the patient's medical, family, and social history in detail and updated the computerized patient record.    Review of Systems   Constitutional: Negative.  Negative for fever.   HENT: Negative.    Eyes: Negative for pain, discharge and redness.   Respiratory: Negative.    Gastrointestinal: Negative for nausea.   Musculoskeletal: Negative for myalgias.   Skin: Positive for wound.       /64   Pulse 50   Ht 188 cm (74.02\")   Wt 113 kg (250 lb)   SpO2 97%   BMI 32.08 kg/m²      Physical Exam   Constitutional: He is oriented to person, place, and time. He appears well-developed and well-nourished.   Eyes: Conjunctivae and EOM are normal. Pupils are equal, round, and reactive to light. Right eye exhibits no discharge. Left eye exhibits no discharge. No scleral icterus.   Pulmonary/Chest: Effort normal.   Neurological: He is alert and oriented to person, place, and time.   Skin: Skin is warm and dry.   Left lower leg there is a small eschar at the location of the stated wound, approximately 1 cm in diameter.  There is a small ring of surrounding erythema less than a centimeter.  There is some nodular density to palpation.  There is no drainage, no warmth or tenderness.  Remainder of the lower leg appears normal.   Psychiatric: He has a normal mood and affect.   Vitals " reviewed.        Current Outpatient Medications:   •  busPIRone (BUSPAR) 15 MG tablet, Take 1 tablet by mouth 3 (Three) Times a Day As Needed (anxiety)., Disp: 30 tablet, Rfl: 1  •  citalopram (CELEXA) 20 MG tablet, Take 1 tablet by mouth Daily., Disp: 30 tablet, Rfl: 1  •  fluticasone (FLONASE) 50 MCG/ACT nasal spray, 2 sprays into the nostril(s) as directed by provider Daily., Disp: 1 bottle, Rfl: 11  •  cephalexin (KEFLEX) 500 MG capsule, Take 1 capsule by mouth 2 (Two) Times a Day., Disp: 20 capsule, Rfl: 0     Left Tib-Fib X-Ray  Indication: Possible foreign body  AP and Lateral views    Findings:  No fracture  No bony lesion  Normal soft tissues  Normal joint spaces  There is a small radiopaque density in the soft tissues overlying the distal tibia consistent with a small foreign body.  There is no visible gas or soft tissue abnormality otherwise.    No prior studies were available for comparison.     Diagnoses and all orders for this visit:    Leg wound, left, initial encounter  -     XR Tibia Fibula 2 View Left  -     cephalexin (KEFLEX) 500 MG capsule; Take 1 capsule by mouth 2 (Two) Times a Day.    Visual changes    Confirmed foreign body.  Based on the size of the foreign body, timing, and clinical impression, I do not see an indication for further exploration of the wound.  We will place him on some Keflex and he will try some warm compresses.  If it has any worsening signs of infection then we can consult surgery or wound care for further debridement.  His tetanus is up-to-date.    Regarding his visual complaints, recommend evaluation with optometry today, needs to evaluate for retinal eye normality.        EMR Dragon/Transcription disclaimer:    Much of this encounter note is an electronic transcription/translation of spoken language to printed text.  The electronic translation of spoken language may permit erroneous, or at times, nonsensical words or phrases to be inadvertently transcribed.  Although  I have reviewed the note for such errors some may still exist.

## 2019-10-24 ENCOUNTER — OFFICE VISIT (OUTPATIENT)
Dept: SPORTS MEDICINE | Facility: CLINIC | Age: 49
End: 2019-10-24

## 2019-10-24 VITALS
BODY MASS INDEX: 34.01 KG/M2 | HEIGHT: 74 IN | DIASTOLIC BLOOD PRESSURE: 80 MMHG | WEIGHT: 265 LBS | OXYGEN SATURATION: 98 % | HEART RATE: 68 BPM | SYSTOLIC BLOOD PRESSURE: 128 MMHG

## 2019-10-24 DIAGNOSIS — M62.08 DIASTASIS RECTI: Primary | ICD-10-CM

## 2019-10-24 PROCEDURE — 99213 OFFICE O/P EST LOW 20 MIN: CPT | Performed by: FAMILY MEDICINE

## 2019-10-24 NOTE — PROGRESS NOTES
"Rickey is a 49 y.o. year old male evaluation of a problem that is new to this examiner.    Chief Complaint   Patient presents with   • Abdominal bulge     possible hernia x 4 months       History of Present Illness   HPI   Over summer noticed a bulge in abdomen when he sat up, not painful. Then saw it again while weightlifting yesterday. Moderate size, assoc with mild soreness worse with cough.     I have reviewed the patient's medical, family, and social history in detail and updated the computerized patient record.    Review of Systems   Constitutional: Negative.    Respiratory: Negative.    Cardiovascular: Negative.    Gastrointestinal: Negative for nausea.       /80   Pulse 68   Ht 188 cm (74.02\")   Wt 120 kg (265 lb)   SpO2 98%   BMI 34.01 kg/m²      Physical Exam   Constitutional: He appears well-developed and well-nourished.   Abdominal: Soft. Bowel sounds are normal.   In the cephalad anterior abdominal wall there is midline bulge that occurs with head and neck flexion.  There is no palpable defect in the abdominal wall.  No tenderness to palpation.   Vitals reviewed.        Current Outpatient Medications:   •  busPIRone (BUSPAR) 15 MG tablet, Take 1 tablet by mouth 3 (Three) Times a Day As Needed (anxiety)., Disp: 30 tablet, Rfl: 1  •  citalopram (CELEXA) 20 MG tablet, Take 1 tablet by mouth Daily., Disp: 30 tablet, Rfl: 1  •  fluticasone (FLONASE) 50 MCG/ACT nasal spray, 2 sprays into the nostril(s) as directed by provider Daily., Disp: 1 bottle, Rfl: 11     Diagnoses and all orders for this visit:    Diastasis recti  -     Ambulatory Referral to Physical Therapy Evaluate and treat       History, physical exam, and bedside ultrasound all consistent with diastasis recti.  Ultrasound shows intact transversalis fascia between the rectus abdominis.  Discussed core strengthening with focus on transverse abdominis and oblique activation.  He will do this independently for now, will engage physical " therapy if desired.      EMR Dragon/Transcription disclaimer:    Much of this encounter note is an electronic transcription/translation of spoken language to printed text.  The electronic translation of spoken language may permit erroneous, or at times, nonsensical words or phrases to be inadvertently transcribed.  Although I have reviewed the note for such errors some may still exist.

## 2019-10-30 DIAGNOSIS — F41.9 ANXIETY: ICD-10-CM

## 2019-10-30 RX ORDER — CITALOPRAM 40 MG/1
40 TABLET ORAL DAILY
Qty: 30 TABLET | Refills: 2 | Status: SHIPPED | OUTPATIENT
Start: 2019-10-30 | End: 2020-02-10

## 2020-02-08 DIAGNOSIS — F41.9 ANXIETY: ICD-10-CM

## 2020-02-10 RX ORDER — CITALOPRAM 40 MG/1
TABLET ORAL
Qty: 30 TABLET | Refills: 3 | Status: SHIPPED | OUTPATIENT
Start: 2020-02-10 | End: 2020-06-19 | Stop reason: SDUPTHER

## 2020-04-02 RX ORDER — FLUTICASONE PROPIONATE 50 MCG
2 SPRAY, SUSPENSION (ML) NASAL DAILY
Qty: 1 BOTTLE | Refills: 11 | Status: SHIPPED | OUTPATIENT
Start: 2020-04-02 | End: 2021-10-25

## 2020-06-19 DIAGNOSIS — F41.9 ANXIETY: ICD-10-CM

## 2020-06-19 RX ORDER — CITALOPRAM 40 MG/1
40 TABLET ORAL DAILY
Qty: 90 TABLET | Refills: 3 | Status: SHIPPED | OUTPATIENT
Start: 2020-06-19 | End: 2021-06-14

## 2021-04-06 ENCOUNTER — BULK ORDERING (OUTPATIENT)
Dept: CASE MANAGEMENT | Facility: OTHER | Age: 51
End: 2021-04-06

## 2021-04-06 DIAGNOSIS — Z23 IMMUNIZATION DUE: ICD-10-CM

## 2021-06-14 ENCOUNTER — OFFICE VISIT (OUTPATIENT)
Dept: SPORTS MEDICINE | Facility: CLINIC | Age: 51
End: 2021-06-14

## 2021-06-14 VITALS
WEIGHT: 265 LBS | DIASTOLIC BLOOD PRESSURE: 84 MMHG | HEART RATE: 73 BPM | OXYGEN SATURATION: 97 % | TEMPERATURE: 97.8 F | RESPIRATION RATE: 16 BRPM | BODY MASS INDEX: 34.01 KG/M2 | SYSTOLIC BLOOD PRESSURE: 126 MMHG | HEIGHT: 74 IN

## 2021-06-14 DIAGNOSIS — F41.9 ANXIETY: Primary | ICD-10-CM

## 2021-06-14 DIAGNOSIS — Z12.11 SCREEN FOR COLON CANCER: ICD-10-CM

## 2021-06-14 PROCEDURE — 99213 OFFICE O/P EST LOW 20 MIN: CPT | Performed by: FAMILY MEDICINE

## 2021-06-14 RX ORDER — BUPROPION HYDROCHLORIDE 150 MG/1
150 TABLET ORAL DAILY
Qty: 30 TABLET | Refills: 2 | Status: SHIPPED | OUTPATIENT
Start: 2021-06-14 | End: 2021-07-07

## 2021-06-14 NOTE — PROGRESS NOTES
"Rickey is a 51 y.o. year old male    Chief Complaint   Patient presents with   • Anxiety       History of Present Illness   HPI   Anxiety continues to wax and wane. Stopped meds a while back because he was unsure if he needed it anymore. Has used a few buspar for actutre relief but feels sluggish from it.  Describes feeling agitated easily.         Review of Systems    /84 (BP Location: Left arm, Patient Position: Sitting, Cuff Size: Adult)   Pulse 73   Temp 97.8 °F (36.6 °C)   Resp 16   Ht 188 cm (74.02\")   Wt 120 kg (265 lb)   SpO2 97%   BMI 34.01 kg/m²          Physical Exam  Vitals reviewed.   Psychiatric:         Mood and Affect: Mood normal.         Behavior: Behavior normal.         Thought Content: Thought content normal.         Judgment: Judgment normal.           Current Outpatient Medications:   •  busPIRone (BUSPAR) 15 MG tablet, Take 1 tablet by mouth 3 (Three) Times a Day As Needed (anxiety)., Disp: 30 tablet, Rfl: 1  •  fluticasone (FLONASE) 50 MCG/ACT nasal spray, 2 sprays into the nostril(s) as directed by provider Daily., Disp: 1 bottle, Rfl: 11  •  buPROPion XL (Wellbutrin XL) 150 MG 24 hr tablet, Take 1 tablet by mouth Daily., Disp: 30 tablet, Rfl: 2     Diagnoses and all orders for this visit:    Anxiety  -     buPROPion XL (Wellbutrin XL) 150 MG 24 hr tablet; Take 1 tablet by mouth Daily.    Screen for colon cancer  -     Ambulatory Referral For Screening Colonoscopy       Discussed anxiety considerations and options for management.  We will plan to follow-up to evaluate response to Wellbutrin and 3 to 4 weeks.  Discussed expectations, potential side effects, etc.  "

## 2021-06-16 ENCOUNTER — TELEPHONE (OUTPATIENT)
Dept: GASTROENTEROLOGY | Facility: CLINIC | Age: 51
End: 2021-06-16

## 2021-07-07 DIAGNOSIS — F41.9 ANXIETY: ICD-10-CM

## 2021-07-07 RX ORDER — BUPROPION HYDROCHLORIDE 300 MG/1
300 TABLET ORAL DAILY
Qty: 90 TABLET | Refills: 1 | Status: SHIPPED | OUTPATIENT
Start: 2021-07-07 | End: 2022-04-12 | Stop reason: SDUPTHER

## 2021-07-09 ENCOUNTER — OFFICE VISIT (OUTPATIENT)
Dept: SPORTS MEDICINE | Facility: CLINIC | Age: 51
End: 2021-07-09

## 2021-07-09 VITALS
DIASTOLIC BLOOD PRESSURE: 78 MMHG | SYSTOLIC BLOOD PRESSURE: 128 MMHG | HEIGHT: 74 IN | BODY MASS INDEX: 34.01 KG/M2 | OXYGEN SATURATION: 98 % | RESPIRATION RATE: 16 BRPM | HEART RATE: 84 BPM | WEIGHT: 265 LBS | TEMPERATURE: 97.1 F

## 2021-07-09 DIAGNOSIS — S30.861A TICK BITE OF ABDOMEN, INITIAL ENCOUNTER: Primary | ICD-10-CM
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
## 2021-07-09 PROCEDURE — 99213 OFFICE O/P EST LOW 20 MIN: CPT | Performed by: FAMILY MEDICINE

## 2021-07-09 RX ORDER — DOXYCYCLINE 100 MG/1
100 CAPSULE ORAL 2 TIMES DAILY
Qty: 20 CAPSULE | Refills: 0 | Status: SHIPPED | OUTPATIENT
Start: 2021-07-09 | End: 2022-04-12

## 2021-07-09 RX ORDER — TRIAMCINOLONE ACETONIDE 1 MG/G
CREAM TOPICAL 3 TIMES DAILY
Qty: 45 G | Refills: 1 | Status: SHIPPED | OUTPATIENT
Start: 2021-07-09

## 2021-07-09 NOTE — PROGRESS NOTES
"Rickey is a 51 y.o. year old male    Chief Complaint   Patient presents with   • Tick Removal     Tick bite on left side, happened Wednesday.        History of Present Illness   HPI   Tick on R thigh in June 6 Wednesday mowing tick left side felt attached, likely on body 30 min or less, red and swollen in 4 hours      Review of Systems    /78 (BP Location: Left arm, Patient Position: Sitting, Cuff Size: Adult)   Pulse 84   Temp 97.1 °F (36.2 °C)   Resp 16   Ht 188 cm (74.02\")   Wt 120 kg (265 lb)   SpO2 98%   BMI 34.01 kg/m²          Physical Exam  Skin:     Comments: L torso - indurated area 1.5x4 cm, erythema larger surrounding             Current Outpatient Medications:   •  buPROPion XL (Wellbutrin XL) 300 MG 24 hr tablet, Take 1 tablet by mouth Daily., Disp: 90 tablet, Rfl: 1  •  busPIRone (BUSPAR) 15 MG tablet, Take 1 tablet by mouth 3 (Three) Times a Day As Needed (anxiety)., Disp: 30 tablet, Rfl: 1  •  fluticasone (FLONASE) 50 MCG/ACT nasal spray, 2 sprays into the nostril(s) as directed by provider Daily., Disp: 1 bottle, Rfl: 11  •  doxycycline (MONODOX) 100 MG capsule, Take 1 capsule by mouth 2 (Two) Times a Day., Disp: 20 capsule, Rfl: 0  •  triamcinolone (KENALOG) 0.1 % cream, Apply  topically to the appropriate area as directed 3 (Three) Times a Day., Disp: 45 g, Rfl: 1     Diagnoses and all orders for this visit:    Tick bite of abdomen, initial encounter  -     doxycycline (MONODOX) 100 MG capsule; Take 1 capsule by mouth 2 (Two) Times a Day.  -     triamcinolone (KENALOG) 0.1 % cream; Apply  topically to the appropriate area as directed 3 (Three) Times a Day.    This appears more inflammatory than infectious but we will exercise caution and cover with with doxycycline.  "

## 2021-10-25 RX ORDER — FLUTICASONE PROPIONATE 50 MCG
SPRAY, SUSPENSION (ML) NASAL
Qty: 16 ML | Refills: 0 | Status: SHIPPED | OUTPATIENT
Start: 2021-10-25 | End: 2022-03-22

## 2022-01-11 ENCOUNTER — PRE-PROCEDURE SCREENING (OUTPATIENT)
Dept: GASTROENTEROLOGY | Facility: CLINIC | Age: 52
End: 2022-01-11

## 2022-01-11 NOTE — TELEPHONE ENCOUNTER
Colonoscopy screening--No personal history of polyps-- No family history of polyps or colon cancer--No blood thinners--Medications:            buPROPion XL (Wellbutrin XL) 300 MG 24 hr tablet  busPIRone (BUSPAR) 15 MG tablet  doxycycline (MONODOX) 100 MG capsule  fluticasone (FLONASE) 50 MCG/ACT nasal spray  triamcinolone (KENALOG) 0.1 % cream          Pt verbalized and understood that it would be few weeks before been schedule

## 2022-01-12 ENCOUNTER — PREP FOR SURGERY (OUTPATIENT)
Dept: OTHER | Facility: HOSPITAL | Age: 52
End: 2022-01-12

## 2022-01-12 DIAGNOSIS — Z12.11 ENCOUNTER FOR SCREENING FOR MALIGNANT NEOPLASM OF COLON: Primary | ICD-10-CM

## 2022-01-12 RX ORDER — SODIUM CHLORIDE, SODIUM LACTATE, POTASSIUM CHLORIDE, CALCIUM CHLORIDE 600; 310; 30; 20 MG/100ML; MG/100ML; MG/100ML; MG/100ML
30 INJECTION, SOLUTION INTRAVENOUS CONTINUOUS
Status: CANCELLED | OUTPATIENT
Start: 2022-03-28

## 2022-01-19 ENCOUNTER — TELEPHONE (OUTPATIENT)
Dept: GASTROENTEROLOGY | Facility: CLINIC | Age: 52
End: 2022-01-19

## 2022-01-19 PROBLEM — Z12.11 ENCOUNTER FOR SCREENING FOR MALIGNANT NEOPLASM OF COLON: Status: ACTIVE | Noted: 2022-01-19

## 2022-01-19 NOTE — TELEPHONE ENCOUNTER
jigna Rosen for 03/10arrive at 730/cs- mailing out prep inst on 1/20, advised pt time is subject to change [facility] will call.

## 2022-01-19 NOTE — TELEPHONE ENCOUNTER
spoke with pt jolene from 3-10 to 3-28 arrive at 0700 am jolene de paz--paperwork updated and mailed

## 2022-03-22 RX ORDER — FLUTICASONE PROPIONATE 50 MCG
SPRAY, SUSPENSION (ML) NASAL
Qty: 16 ML | Refills: 0 | Status: SHIPPED | OUTPATIENT
Start: 2022-03-22 | End: 2022-08-29

## 2022-03-28 ENCOUNTER — HOSPITAL ENCOUNTER (OUTPATIENT)
Facility: HOSPITAL | Age: 52
Setting detail: HOSPITAL OUTPATIENT SURGERY
Discharge: HOME | End: 2022-03-28
Attending: INTERNAL MEDICINE | Admitting: INTERNAL MEDICINE

## 2022-03-28 ENCOUNTER — ANESTHESIA EVENT (OUTPATIENT)
Dept: GASTROENTEROLOGY | Facility: HOSPITAL | Age: 52
End: 2022-03-28

## 2022-03-28 ENCOUNTER — ANESTHESIA (OUTPATIENT)
Dept: GASTROENTEROLOGY | Facility: HOSPITAL | Age: 52
End: 2022-03-28

## 2022-03-28 VITALS
RESPIRATION RATE: 16 BRPM | DIASTOLIC BLOOD PRESSURE: 62 MMHG | HEART RATE: 60 BPM | HEIGHT: 74 IN | WEIGHT: 234 LBS | OXYGEN SATURATION: 98 % | SYSTOLIC BLOOD PRESSURE: 128 MMHG | TEMPERATURE: 97.6 F | BODY MASS INDEX: 30.03 KG/M2

## 2022-03-28 DIAGNOSIS — Z12.11 ENCOUNTER FOR SCREENING FOR MALIGNANT NEOPLASM OF COLON: ICD-10-CM

## 2022-03-28 PROCEDURE — 25010000002 PROPOFOL 10 MG/ML EMULSION: Performed by: ANESTHESIOLOGY

## 2022-03-28 PROCEDURE — 45378 DIAGNOSTIC COLONOSCOPY: CPT | Performed by: INTERNAL MEDICINE

## 2022-03-28 RX ORDER — PROPOFOL 10 MG/ML
VIAL (ML) INTRAVENOUS AS NEEDED
Status: DISCONTINUED | OUTPATIENT
Start: 2022-03-28 | End: 2022-03-28 | Stop reason: SURG

## 2022-03-28 RX ORDER — IBUPROFEN 600 MG/1
600 TABLET ORAL EVERY 6 HOURS PRN
COMMUNITY
End: 2022-05-04

## 2022-03-28 RX ORDER — SODIUM CHLORIDE, SODIUM LACTATE, POTASSIUM CHLORIDE, CALCIUM CHLORIDE 600; 310; 30; 20 MG/100ML; MG/100ML; MG/100ML; MG/100ML
30 INJECTION, SOLUTION INTRAVENOUS CONTINUOUS
Status: DISCONTINUED | OUTPATIENT
Start: 2022-03-28 | End: 2022-03-28 | Stop reason: HOSPADM

## 2022-03-28 RX ORDER — LIDOCAINE HYDROCHLORIDE 20 MG/ML
INJECTION, SOLUTION INFILTRATION; PERINEURAL AS NEEDED
Status: DISCONTINUED | OUTPATIENT
Start: 2022-03-28 | End: 2022-03-28 | Stop reason: SURG

## 2022-03-28 RX ORDER — PROPOFOL 10 MG/ML
VIAL (ML) INTRAVENOUS CONTINUOUS PRN
Status: DISCONTINUED | OUTPATIENT
Start: 2022-03-28 | End: 2022-03-28 | Stop reason: SURG

## 2022-03-28 RX ADMIN — PROPOFOL 100 MG: 10 INJECTION, EMULSION INTRAVENOUS at 08:03

## 2022-03-28 RX ADMIN — SODIUM CHLORIDE, POTASSIUM CHLORIDE, SODIUM LACTATE AND CALCIUM CHLORIDE 30 ML/HR: 600; 310; 30; 20 INJECTION, SOLUTION INTRAVENOUS at 07:36

## 2022-03-28 RX ADMIN — Medication 140 MCG/KG/MIN: at 08:03

## 2022-03-28 RX ADMIN — LIDOCAINE HYDROCHLORIDE 60 MG: 20 INJECTION, SOLUTION INFILTRATION; PERINEURAL at 08:03

## 2022-03-28 NOTE — ANESTHESIA PREPROCEDURE EVALUATION
" Anesthesia Evaluation     Patient summary reviewed and Nursing notes reviewed   NPO Solid Status: > 8 hours  NPO Liquid Status: > 2 hours           Airway   Mallampati: II  TM distance: >3 FB  Neck ROM: full  No difficulty expected  Dental      Pulmonary    (+) a smoker Former,   Cardiovascular         Neuro/Psych  GI/Hepatic/Renal/Endo    (+) obesity,       Musculoskeletal     Abdominal    Substance History      OB/GYN          Other                        Anesthesia Plan    ASA 2     MAC   (I have reviewed the patient's history and chart with the patient, including all pertinent laboratory results and imaging. I have explained the risks of anesthesia including but not limited to dental damage, corneal abrasion, nerve injury, MI, stroke, aspiration, and death. Patient has agreed to proceed.     /73 (BP Location: Left arm, Patient Position: Lying)   Pulse 62   Temp 36.4 °C (97.6 °F) (Oral)   Resp 16   Ht 188 cm (74\")   Wt 106 kg (234 lb)   SpO2 96%   BMI 30.04 kg/m²   )  intravenous induction     Anesthetic plan, all risks, benefits, and alternatives have been provided, discussed and informed consent has been obtained with: patient.        CODE STATUS:       "

## 2022-03-28 NOTE — ANESTHESIA POSTPROCEDURE EVALUATION
Patient: Rickey Bedoya    Procedure Summary     Date: 03/28/22 Room / Location:  [facility]    Anesthesia Start: 0758 Anesthesia Stop: 0819    Procedure: COLONOSCOPY INTO CECUM AND TI (N/A ) Diagnosis:       Encounter for screening for malignant neoplasm of colon      (Encounter for screening for malignant neoplasm of colon [Z12.11])    Surgeons: Kyaw Woodson MD Provider: Sarita Thakur MD    Anesthesia Type: MAC ASA Status: 2          Anesthesia Type: MAC    Vitals  Vitals Value Taken Time   /62 03/28/22 0840   Temp     Pulse 60 03/28/22 0840   Resp 16 03/28/22 0840   SpO2 98 % 03/28/22 0840           Post Anesthesia Care and Evaluation    Patient location during evaluation: bedside  Patient participation: complete - patient participated  Level of consciousness: awake and alert  Pain management: adequate  Airway patency: patent  Anesthetic complications: No anesthetic complications  PONV Status: controlled  Cardiovascular status: acceptable  Respiratory status: acceptable  Hydration status: acceptable

## 2022-03-28 NOTE — H&P
[facility], [place], operated by [facility]  Pre Procedure History & Physical    Chief Complaint:   Time for my colonoscopy    Subjective     HPI:   52 y.o. male presenting for average risk screening.  No prior cscope.  No current GI issues.  No family hx of colon cancer or polyps.        Past Medical History:   Past Medical History:   Diagnosis Date   • Allergic     have been tested       Family History:  Family History   Problem Relation Age of Onset   • COPD Mother    • Heart failure Mother    • Diabetes Father    • Heart failure Father    • Seizures Father    • No Known Problems Son    • No Known Problems Daughter        Social History:   reports that he quit smoking about 11 years ago. He started smoking about 32 years ago. He quit after 20.00 years of use. He has never used smokeless tobacco. He reports that he does not drink alcohol and does not use drugs.    Medications:   Medications Prior to Admission   Medication Sig Dispense Refill Last Dose   • fluticasone (FLONASE) 50 MCG/ACT nasal spray SPRAY TWO SPRAYS IN EACH NOSTRIL ONCE DAILY AS DIRECTED 16 mL 0 Past Week at Unknown time   • ibuprofen (ADVIL,MOTRIN) 600 MG tablet Take 600 mg by mouth Every 6 (Six) Hours As Needed for Mild Pain .   Past Month at Unknown time   • buPROPion XL (Wellbutrin XL) 300 MG 24 hr tablet Take 1 tablet by mouth Daily. 90 tablet 1    • busPIRone (BUSPAR) 15 MG tablet Take 1 tablet by mouth 3 (Three) Times a Day As Needed (anxiety). 30 tablet 1 Unknown at Unknown time   • doxycycline (MONODOX) 100 MG capsule Take 1 capsule by mouth 2 (Two) Times a Day. 20 capsule 0    • triamcinolone (KENALOG) 0.1 % cream Apply  topically to the appropriate area as directed 3 (Three) Times a Day. 45 g 1 Unknown at Unknown time       Allergies:  Patient has no known allergies.    ROS:    Pertinent items are noted in HPI     Objective     Blood pressure 141/73, pulse 62, temperature 97.6 °F (36.4 °C), temperature source Oral, resp. rate 16, height 188 cm  "(74\"), weight 106 kg (234 lb), SpO2 96 %.    Physical Exam   Constitutional: Pt is oriented to person, place, and time and well-developed, well-nourished, and in no distress.   Abdominal: Soft.   Psychiatric: Mood, memory, affect and judgment normal.     Assessment/Plan     Diagnosis:  Encounter for screening for colon cancer    Anticipated Surgical Procedure:  Colonoscopy    The risks, benefits, and alternatives of this procedure have been discussed with the patient or the responsible party- the patient understands and agrees to proceed.                                                                "

## 2022-04-11 ENCOUNTER — PATIENT MESSAGE (OUTPATIENT)
Dept: SPORTS MEDICINE | Facility: CLINIC | Age: 52
End: 2022-04-11

## 2022-04-11 DIAGNOSIS — F41.9 ANXIETY: ICD-10-CM

## 2022-04-12 RX ORDER — BUPROPION HYDROCHLORIDE 150 MG/1
150 TABLET ORAL DAILY
Qty: 30 TABLET | Refills: 2 | Status: SHIPPED | OUTPATIENT
Start: 2022-04-12 | End: 2022-05-04

## 2022-04-12 NOTE — TELEPHONE ENCOUNTER
Anmol España MA 4/12/2022 8:17 AM EDT      ----- Message -----  From: Rickey Bedoya  Sent: 4/11/2022 5:32 PM EDT  To: [facility] Clinical Pool  Subject: Bupropion     I would like to try this med once more, I still have 300mg xl, just wondering if I should start on 150 first and then increase? If so, I do not have any 150mg

## 2022-05-04 ENCOUNTER — OFFICE VISIT (OUTPATIENT)
Dept: SPORTS MEDICINE | Facility: CLINIC | Age: 52
End: 2022-05-04

## 2022-05-04 DIAGNOSIS — F41.9 ANXIETY: ICD-10-CM

## 2022-05-04 PROCEDURE — 99442 PR PHYS/QHP TELEPHONE EVALUATION 11-20 MIN: CPT | Performed by: FAMILY MEDICINE

## 2022-05-04 RX ORDER — BUPROPION HYDROCHLORIDE 300 MG/1
300 TABLET ORAL DAILY
Qty: 90 TABLET | Refills: 1 | Status: SHIPPED | OUTPATIENT
Start: 2022-05-04 | End: 2022-11-22

## 2022-05-04 NOTE — PROGRESS NOTES
Telephone Visit Note    Chief Complaint   Patient presents with   • Follow-up     F/u discussion medication recently restarted          History of Present Illness  F/u resuming wellbutrin; symptoms had again increased over the past few months and family raised concerns. Tolerating well, less noted SE with this trial. Does note some benefits but wants to see more.   Has used buspar twice in the past year.       Diagnoses and all orders for this visit:    Anxiety  -     buPROPion XL (Wellbutrin XL) 300 MG 24 hr tablet; Take 1 tablet by mouth Daily.    Discussed dose increase, expectations, etc.   F/u for CPE later this year.     This patient was evaluated by telephone due to current precautions with COVID-19.  Consent to telephone visit for this problem was given by the patient. I spent a total of 13 minutes on the phone with the patient.

## 2022-08-29 RX ORDER — FLUTICASONE PROPIONATE 50 MCG
2 SPRAY, SUSPENSION (ML) NASAL DAILY
Qty: 16 ML | Refills: 0 | Status: SHIPPED | OUTPATIENT
Start: 2022-08-29

## 2022-11-20 DIAGNOSIS — F41.9 ANXIETY: ICD-10-CM

## 2022-11-22 RX ORDER — BUPROPION HYDROCHLORIDE 300 MG/1
TABLET ORAL
Qty: 30 TABLET | Refills: 3 | Status: SHIPPED | OUTPATIENT
Start: 2022-11-22 | End: 2023-03-20

## 2023-03-13 NOTE — TELEPHONE ENCOUNTER
I called PT to get new insurance info, PT was confused and was wondering why this CT was ordered being he hasnt been in office since October 2017. He was wondering if you were going to order one of these yearly?   He did not give me new ins info and said to wait to authorized this until spoke with you. Pt feels like he is fine.    Triage Note - Nursing Documentation  Labor and Delivery Admission Log    Alva Pitts  : 1995  MRN: [id number]  CSN: [id number]    Date in / Time in:  3/12/

## 2023-03-19 DIAGNOSIS — F41.9 ANXIETY: ICD-10-CM

## 2023-03-20 RX ORDER — BUPROPION HYDROCHLORIDE 300 MG/1
TABLET ORAL
Qty: 30 TABLET | Refills: 3 | Status: SHIPPED | OUTPATIENT
Start: 2023-03-20

## 2023-03-22 ENCOUNTER — TELEPHONE (OUTPATIENT)
Dept: SPORTS MEDICINE | Facility: CLINIC | Age: 53
End: 2023-03-22
Payer: COMMERCIAL

## 2023-03-22 NOTE — TELEPHONE ENCOUNTER
Patient called with following symptoms:  sore throat, drainage, sinus pressure, achey, starting to feel in chest, coughing clumps. Symptoms began Sunday. Has had negative covid test. Kids have been sick and put on antibiotics.     Spoke with DANII loyd to refer patient to urgent care. If symptoms persist in few days call back and may consider for video visit.

## 2023-05-18 RX ORDER — FLUTICASONE PROPIONATE 50 MCG
SPRAY, SUSPENSION (ML) NASAL
Qty: 16 ML | Refills: 0 | Status: SHIPPED | OUTPATIENT
Start: 2023-05-18

## 2024-01-22 ENCOUNTER — OFFICE VISIT (OUTPATIENT)
Dept: SPORTS MEDICINE | Facility: CLINIC | Age: 54
End: 2024-01-22
Payer: COMMERCIAL

## 2024-01-22 VITALS
HEART RATE: 65 BPM | DIASTOLIC BLOOD PRESSURE: 88 MMHG | WEIGHT: 254 LBS | HEIGHT: 74 IN | SYSTOLIC BLOOD PRESSURE: 138 MMHG | BODY MASS INDEX: 32.6 KG/M2 | OXYGEN SATURATION: 98 % | TEMPERATURE: 97.2 F

## 2024-01-22 DIAGNOSIS — Z12.5 SCREENING FOR PROSTATE CANCER: ICD-10-CM

## 2024-01-22 DIAGNOSIS — N50.812 LEFT TESTICULAR PAIN: Primary | ICD-10-CM

## 2024-01-22 DIAGNOSIS — Z00.00 ANNUAL PHYSICAL EXAM: ICD-10-CM

## 2024-01-22 PROCEDURE — 99213 OFFICE O/P EST LOW 20 MIN: CPT | Performed by: FAMILY MEDICINE

## 2024-01-22 RX ORDER — SULFAMETHOXAZOLE AND TRIMETHOPRIM 800; 160 MG/1; MG/1
1 TABLET ORAL 2 TIMES DAILY
Qty: 20 TABLET | Refills: 0 | Status: SHIPPED | OUTPATIENT
Start: 2024-01-22

## 2024-01-22 NOTE — PROGRESS NOTES
"Rickey is a 53 y.o. year old male    Chief Complaint   Patient presents with    Testicle Pain     Patient is here for pain on left testicle, pain radiates to low back. Pain started on 01/18, NKI.        History of Present Illness   HPI   Dull age in left testicle last week, noted pulling into left groin. Relieved by rest. No direct ttp. Noted pain in low back by Saturday, better with getting up and moving. Ibuprofen not clearly helping.   No pain with urination. But does note more frequent and less pressure in flow.   Lower volume semen, intermittent chronic tinges of blood in semen.     Review of Systems    /88 (BP Location: Left arm, Patient Position: Sitting, Cuff Size: Adult)   Pulse 65   Temp 97.2 °F (36.2 °C) (Temporal)   Ht 188 cm (74\")   Wt 115 kg (254 lb)   SpO2 98%   BMI 32.61 kg/m²          Physical Exam  Vitals reviewed.   Genitourinary:     Comments: Normal penis and scrotum.  No testicular tenderness.  No abnormal masses in the epididymis.  Psychiatric:         Mood and Affect: Mood normal.           Current Outpatient Medications:     busPIRone (BUSPAR) 15 MG tablet, Take 1 tablet by mouth 3 (Three) Times a Day As Needed (anxiety)., Disp: 30 tablet, Rfl: 1    fluticasone (FLONASE) 50 MCG/ACT nasal spray, SPRAY TWO SPRAYS IN EACH NOSTRIL ONCE DAILY, Disp: 16 mL, Rfl: 0    sulfamethoxazole-trimethoprim (Bactrim DS) 800-160 MG per tablet, Take 1 tablet by mouth 2 (Two) Times a Day., Disp: 20 tablet, Rfl: 0     Diagnoses and all orders for this visit:    Left testicular pain  -     sulfamethoxazole-trimethoprim (Bactrim DS) 800-160 MG per tablet; Take 1 tablet by mouth 2 (Two) Times a Day.    Annual physical exam  -     CBC & Differential; Future  -     Comprehensive Metabolic Panel; Future  -     Lipid Panel With / Chol / HDL Ratio; Future  -     Thyroid Cascade Profile; Future  -     Urinalysis With Culture If Indicated -; Future  -     Hemoglobin A1c; Future  -     PSA Screen; Future  -     " Testosterone, Free, Total; Future    Screening for prostate cancer  -     PSA Screen; Future       Possible epididymitis, cover with Bactrim.  If not improving because of this combined with chronic hematospermia we will plan urology consult.      EMR Dragon/Transcription disclaimer:    Much of this encounter note is an electronic transcription/translation of spoken language to printed text.  The electronic translation of spoken language may permit erroneous, or at times, nonsensical words or phrases to be inadvertently transcribed.  Although I have reviewed the note for such errors some may still exist.

## 2024-01-25 ENCOUNTER — LAB (OUTPATIENT)
Dept: LAB | Facility: HOSPITAL | Age: 54
End: 2024-01-25
Payer: COMMERCIAL

## 2024-01-25 DIAGNOSIS — Z12.5 SCREENING FOR PROSTATE CANCER: ICD-10-CM

## 2024-01-25 DIAGNOSIS — Z00.00 ANNUAL PHYSICAL EXAM: ICD-10-CM

## 2024-01-25 LAB
ALBUMIN SERPL-MCNC: 4.2 G/DL (ref 3.5–5.2)
ALBUMIN/GLOB SERPL: 1.5 G/DL
ALP SERPL-CCNC: 93 U/L (ref 39–117)
ALT SERPL W P-5'-P-CCNC: 16 U/L (ref 1–41)
ANION GAP SERPL CALCULATED.3IONS-SCNC: 8.2 MMOL/L (ref 5–15)
AST SERPL-CCNC: 16 U/L (ref 1–40)
BACTERIA UR QL AUTO: NORMAL /HPF
BASOPHILS # BLD AUTO: 0.08 10*3/MM3 (ref 0–0.2)
BASOPHILS NFR BLD AUTO: 1.4 % (ref 0–1.5)
BILIRUB SERPL-MCNC: 0.4 MG/DL (ref 0–1.2)
BILIRUB UR QL STRIP: NEGATIVE
BUN SERPL-MCNC: 17 MG/DL (ref 6–20)
BUN/CREAT SERPL: 15.6 (ref 7–25)
CALCIUM SPEC-SCNC: 9 MG/DL (ref 8.6–10.5)
CHLORIDE SERPL-SCNC: 105 MMOL/L (ref 98–107)
CHOLEST SERPL-MCNC: 164 MG/DL (ref 0–200)
CLARITY UR: CLEAR
CO2 SERPL-SCNC: 24.8 MMOL/L (ref 22–29)
COLOR UR: YELLOW
CREAT SERPL-MCNC: 1.09 MG/DL (ref 0.76–1.27)
DEPRECATED RDW RBC AUTO: 39 FL (ref 37–54)
EGFRCR SERPLBLD CKD-EPI 2021: 82.2 ML/MIN/1.73
EOSINOPHIL # BLD AUTO: 0.16 10*3/MM3 (ref 0–0.4)
EOSINOPHIL NFR BLD AUTO: 2.8 % (ref 0.3–6.2)
ERYTHROCYTE [DISTWIDTH] IN BLOOD BY AUTOMATED COUNT: 12 % (ref 12.3–15.4)
GLOBULIN UR ELPH-MCNC: 2.8 GM/DL
GLUCOSE SERPL-MCNC: 92 MG/DL (ref 65–99)
GLUCOSE UR STRIP-MCNC: NEGATIVE MG/DL
HBA1C MFR BLD: 5.3 % (ref 4.8–5.6)
HCT VFR BLD AUTO: 42.3 % (ref 37.5–51)
HDLC SERPL QL: 2.83
HDLC SERPL-MCNC: 58 MG/DL (ref 40–60)
HGB BLD-MCNC: 14.4 G/DL (ref 13–17.7)
HGB UR QL STRIP.AUTO: ABNORMAL
HOLD SPECIMEN: NORMAL
HYALINE CASTS UR QL AUTO: NORMAL /LPF
IMM GRANULOCYTES # BLD AUTO: 0.02 10*3/MM3 (ref 0–0.05)
IMM GRANULOCYTES NFR BLD AUTO: 0.3 % (ref 0–0.5)
KETONES UR QL STRIP: NEGATIVE
LDLC SERPL CALC-MCNC: 96 MG/DL (ref 0–100)
LEUKOCYTE ESTERASE UR QL STRIP.AUTO: NEGATIVE
LYMPHOCYTES # BLD AUTO: 1.03 10*3/MM3 (ref 0.7–3.1)
LYMPHOCYTES NFR BLD AUTO: 17.9 % (ref 19.6–45.3)
MCH RBC QN AUTO: 30.3 PG (ref 26.6–33)
MCHC RBC AUTO-ENTMCNC: 34 G/DL (ref 31.5–35.7)
MCV RBC AUTO: 89.1 FL (ref 79–97)
MONOCYTES # BLD AUTO: 0.56 10*3/MM3 (ref 0.1–0.9)
MONOCYTES NFR BLD AUTO: 9.7 % (ref 5–12)
NEUTROPHILS NFR BLD AUTO: 3.91 10*3/MM3 (ref 1.7–7)
NEUTROPHILS NFR BLD AUTO: 67.9 % (ref 42.7–76)
NITRITE UR QL STRIP: NEGATIVE
NRBC BLD AUTO-RTO: 0 /100 WBC (ref 0–0.2)
PH UR STRIP.AUTO: 6.5 [PH] (ref 5–8)
PLATELET # BLD AUTO: 321 10*3/MM3 (ref 140–450)
PMV BLD AUTO: 8.7 FL (ref 6–12)
POTASSIUM SERPL-SCNC: 4.5 MMOL/L (ref 3.5–5.2)
PROT SERPL-MCNC: 7 G/DL (ref 6–8.5)
PROT UR QL STRIP: NEGATIVE
PSA SERPL-MCNC: 1.08 NG/ML (ref 0–4)
RBC # BLD AUTO: 4.75 10*6/MM3 (ref 4.14–5.8)
RBC # UR STRIP: NORMAL /HPF
REF LAB TEST METHOD: NORMAL
SODIUM SERPL-SCNC: 138 MMOL/L (ref 136–145)
SP GR UR STRIP: 1.02 (ref 1–1.03)
SQUAMOUS #/AREA URNS HPF: NORMAL /HPF
TRIGL SERPL-MCNC: 49 MG/DL (ref 0–150)
UROBILINOGEN UR QL STRIP: ABNORMAL
VLDLC SERPL-MCNC: 10 MG/DL (ref 5–40)
WBC # UR STRIP: NORMAL /HPF
WBC NRBC COR # BLD AUTO: 5.76 10*3/MM3 (ref 3.4–10.8)

## 2024-01-25 PROCEDURE — 83036 HEMOGLOBIN GLYCOSYLATED A1C: CPT

## 2024-01-25 PROCEDURE — 84403 ASSAY OF TOTAL TESTOSTERONE: CPT

## 2024-01-25 PROCEDURE — 84402 ASSAY OF FREE TESTOSTERONE: CPT

## 2024-01-25 PROCEDURE — 80061 LIPID PANEL: CPT

## 2024-01-25 PROCEDURE — G0103 PSA SCREENING: HCPCS

## 2024-01-25 PROCEDURE — 81001 URINALYSIS AUTO W/SCOPE: CPT

## 2024-01-25 PROCEDURE — 80050 GENERAL HEALTH PANEL: CPT

## 2024-01-25 PROCEDURE — 36415 COLL VENOUS BLD VENIPUNCTURE: CPT

## 2024-01-26 LAB — TSH SERPL DL<=0.005 MIU/L-ACNC: 0.87 UIU/ML (ref 0.45–4.5)

## 2024-02-05 ENCOUNTER — OFFICE VISIT (OUTPATIENT)
Dept: SPORTS MEDICINE | Facility: CLINIC | Age: 54
End: 2024-02-05
Payer: COMMERCIAL

## 2024-02-05 VITALS
DIASTOLIC BLOOD PRESSURE: 72 MMHG | HEIGHT: 74 IN | TEMPERATURE: 98.1 F | HEART RATE: 74 BPM | OXYGEN SATURATION: 95 % | SYSTOLIC BLOOD PRESSURE: 128 MMHG | BODY MASS INDEX: 32.47 KG/M2 | WEIGHT: 253 LBS

## 2024-02-05 DIAGNOSIS — N50.812 LEFT TESTICULAR PAIN: ICD-10-CM

## 2024-02-05 DIAGNOSIS — Z12.2 SCREENING FOR LUNG CANCER: ICD-10-CM

## 2024-02-05 DIAGNOSIS — Z00.00 ANNUAL PHYSICAL EXAM: Primary | ICD-10-CM

## 2024-02-05 DIAGNOSIS — Z87.891 FORMER SMOKER: ICD-10-CM

## 2024-02-05 PROCEDURE — 99396 PREV VISIT EST AGE 40-64: CPT | Performed by: FAMILY MEDICINE

## 2024-02-05 RX ORDER — FLUTICASONE PROPIONATE 50 MCG
2 SPRAY, SUSPENSION (ML) NASAL DAILY
Qty: 16 ML | Refills: 11 | Status: SHIPPED | OUTPATIENT
Start: 2024-02-05

## 2024-02-05 RX ORDER — CYCLOBENZAPRINE HCL 5 MG
TABLET ORAL
Qty: 30 TABLET | Refills: 1 | Status: SHIPPED | OUTPATIENT
Start: 2024-02-05

## 2024-02-05 RX ORDER — SULFAMETHOXAZOLE AND TRIMETHOPRIM 800; 160 MG/1; MG/1
1 TABLET ORAL 2 TIMES DAILY
Qty: 14 TABLET | Refills: 0 | Status: SHIPPED | OUTPATIENT
Start: 2024-02-05

## 2024-02-05 NOTE — PROGRESS NOTES
"Rickey Bedoya is here today for an annual physical exam.     Diet: protein shakes and balanced meals  Exercise: weightlifting, ropes for cardio - 4 days/week 45-60 minutes  Trying to lose weight  Scrotal pain 90% improved  Rare episodes of back pain with spasm  Anxiety much more controlled now    PHQ-9 Depression Screening  Little interest or pleasure in doing things? 1-->several days   Feeling down, depressed, or hopeless? 1-->several days   Trouble falling or staying asleep, or sleeping too much? 1-->several days   Feeling tired or having little energy? 1-->several days   Poor appetite or overeating? 1-->several days   Feeling bad about yourself - or that you are a failure or have let yourself or your family down? 0-->not at all   Trouble concentrating on things, such as reading the newspaper or watching television? 1-->several days   Moving or speaking so slowly that other people could have noticed? Or the opposite - being so fidgety or restless that you have been moving around a lot more than usual? 0-->not at all   Thoughts that you would be better off dead, or of hurting yourself in some way? 0-->not at all   PHQ-9 Total Score 6   If you checked off any problems, how difficult have these problems made it for you to do your work, take care of things at home, or get along with other people? not difficult at all          Health Maintenance   Topic Date Due    HEPATITIS C SCREENING  Never done    ANNUAL PHYSICAL  12/22/2017    BMI FOLLOWUP  10/24/2020    ZOSTER VACCINE (1 of 2) Never done    LUNG CANCER SCREENING  03/04/2022    COVID-19 Vaccine (3 - 2023-24 season) 09/01/2023    TDAP/TD VACCINES (2 - Td or Tdap) 08/22/2026    COLORECTAL CANCER SCREENING  03/28/2032    INFLUENZA VACCINE  Completed    Pneumococcal Vaccine 0-64  Aged Out       Review of Systems    /72 (BP Location: Right arm, Patient Position: Sitting, Cuff Size: Adult)   Pulse 74   Temp 98.1 °F (36.7 °C) (Temporal)   Ht 188 cm (74\")   Wt " 115 kg (253 lb)   SpO2 95%   BMI 32.48 kg/m²      BMI is >= 30 and <35. (Class 1 Obesity). The following options were offered after discussion;: exercise counseling/recommendations and nutrition counseling/recommendations      Physical Exam    Vital signs reviewed.  General appearance: No acute distress  Eyes: conjunctiva clear without erythema; pupils equally round and reactive  ENT: external ears normal; hearing normal  Neck: supple; no thyromegaly  CV: normal rate and rhythm; no peripheral edema  Respiratory: normal respiratory effort; lungs clear to auscultation bilaterally  MSK: normal gait and station; no focal joint deformity or swelling  Skin: no rash or wounds; normal turgor  Neuro: cranial nerves 2-12 grossly intact; normal sensation to light touch  Psych: mood and affect normal; recent and remote memory intact      Current Outpatient Medications:     fluticasone (FLONASE) 50 MCG/ACT nasal spray, 2 sprays by Each Nare route Daily., Disp: 16 mL, Rfl: 11    sulfamethoxazole-trimethoprim (Bactrim DS) 800-160 MG per tablet, Take 1 tablet by mouth 2 (Two) Times a Day., Disp: 14 tablet, Rfl: 0    cyclobenzaprine (FLEXERIL) 5 MG tablet, Take 1-2 tabs up to TID prn muscle spasm, Disp: 30 tablet, Rfl: 1    Diagnoses and all orders for this visit:    1. Annual physical exam (Primary)    2. Left testicular pain  -     sulfamethoxazole-trimethoprim (Bactrim DS) 800-160 MG per tablet; Take 1 tablet by mouth 2 (Two) Times a Day.  Dispense: 14 tablet; Refill: 0    3. Former smoker  -     CT chest low dose wo; Future    4. Screening for lung cancer  -     CT chest low dose wo; Future    Other orders  -     fluticasone (FLONASE) 50 MCG/ACT nasal spray; 2 sprays by Each Nare route Daily.  Dispense: 16 mL; Refill: 11  -     cyclobenzaprine (FLEXERIL) 5 MG tablet; Take 1-2 tabs up to TID prn muscle spasm  Dispense: 30 tablet; Refill: 1        Encourage healthy diet and exercise.  Encourage patient to stay up to date on  screening examinations as indicated based on age and risk factors.  Continue lifestyle for weight loss - follow up on testo level (notes more frequent injury and not making progress)  Labs reassuring  Extend abx 7 more days for epididymitis resolving

## 2024-02-06 LAB
TESTOST FREE SERPL-MCNC: 12.9 PG/ML (ref 7.2–24)
TESTOST SERPL-MCNC: 622 NG/DL (ref 264–916)

## 2024-02-22 ENCOUNTER — HOSPITAL ENCOUNTER (OUTPATIENT)
Dept: CT IMAGING | Facility: HOSPITAL | Age: 54
Discharge: HOME | End: 2024-02-22
Admitting: FAMILY MEDICINE
Payer: COMMERCIAL

## 2024-02-22 DIAGNOSIS — Z87.891 FORMER SMOKER: ICD-10-CM

## 2024-02-22 DIAGNOSIS — Z12.2 SCREENING FOR LUNG CANCER: ICD-10-CM

## 2024-02-22 PROCEDURE — 71271 CT THORAX LUNG CANCER SCR C-: CPT

## 2025-02-13 ENCOUNTER — LAB (OUTPATIENT)
Dept: LAB | Facility: HOSPITAL | Age: 55
End: 2025-02-13
Payer: COMMERCIAL

## 2025-02-13 ENCOUNTER — OFFICE VISIT (OUTPATIENT)
Dept: SPORTS MEDICINE | Facility: CLINIC | Age: 55
End: 2025-02-13
Payer: COMMERCIAL

## 2025-02-13 VITALS
WEIGHT: 236 LBS | SYSTOLIC BLOOD PRESSURE: 124 MMHG | HEART RATE: 61 BPM | DIASTOLIC BLOOD PRESSURE: 80 MMHG | HEIGHT: 74 IN | TEMPERATURE: 97.2 F | OXYGEN SATURATION: 95 % | BODY MASS INDEX: 30.29 KG/M2

## 2025-02-13 DIAGNOSIS — Z87.891 FORMER SMOKER: ICD-10-CM

## 2025-02-13 DIAGNOSIS — Z12.2 SCREENING FOR LUNG CANCER: ICD-10-CM

## 2025-02-13 DIAGNOSIS — Z00.00 ANNUAL PHYSICAL EXAM: Primary | ICD-10-CM

## 2025-02-13 DIAGNOSIS — Z12.5 SCREENING FOR PROSTATE CANCER: ICD-10-CM

## 2025-02-13 LAB
ALBUMIN SERPL-MCNC: 4.1 G/DL (ref 3.5–5.2)
ALBUMIN/GLOB SERPL: 1.5 G/DL
ALP SERPL-CCNC: 75 U/L (ref 39–117)
ALT SERPL W P-5'-P-CCNC: 17 U/L (ref 1–41)
ANION GAP SERPL CALCULATED.3IONS-SCNC: 9.8 MMOL/L (ref 5–15)
AST SERPL-CCNC: 16 U/L (ref 1–40)
BACTERIA UR QL AUTO: NORMAL /HPF
BASOPHILS # BLD AUTO: 0.07 10*3/MM3 (ref 0–0.2)
BASOPHILS NFR BLD AUTO: 1.7 % (ref 0–1.5)
BILIRUB SERPL-MCNC: 0.6 MG/DL (ref 0–1.2)
BILIRUB UR QL STRIP: NEGATIVE
BUN SERPL-MCNC: 17 MG/DL (ref 6–20)
BUN/CREAT SERPL: 18.3 (ref 7–25)
CALCIUM SPEC-SCNC: 9.3 MG/DL (ref 8.6–10.5)
CHLORIDE SERPL-SCNC: 103 MMOL/L (ref 98–107)
CHOLEST SERPL-MCNC: 206 MG/DL (ref 0–200)
CLARITY UR: CLEAR
CO2 SERPL-SCNC: 26.2 MMOL/L (ref 22–29)
COLOR UR: YELLOW
CREAT SERPL-MCNC: 0.93 MG/DL (ref 0.76–1.27)
CRP SERPL-MCNC: 0.07 MG/DL (ref 0.01–0.5)
DEPRECATED RDW RBC AUTO: 39.9 FL (ref 37–54)
EGFRCR SERPLBLD CKD-EPI 2021: 98.8 ML/MIN/1.73
EOSINOPHIL # BLD AUTO: 0.14 10*3/MM3 (ref 0–0.4)
EOSINOPHIL NFR BLD AUTO: 3.3 % (ref 0.3–6.2)
ERYTHROCYTE [DISTWIDTH] IN BLOOD BY AUTOMATED COUNT: 12.2 % (ref 12.3–15.4)
GLOBULIN UR ELPH-MCNC: 2.7 GM/DL
GLUCOSE SERPL-MCNC: 88 MG/DL (ref 65–99)
GLUCOSE UR STRIP-MCNC: NEGATIVE MG/DL
HBA1C MFR BLD: 5 % (ref 4.8–5.6)
HCT VFR BLD AUTO: 41.1 % (ref 37.5–51)
HCV AB SER QL: NORMAL
HDLC SERPL QL: 3.12
HDLC SERPL-MCNC: 66 MG/DL (ref 40–60)
HGB BLD-MCNC: 13.9 G/DL (ref 13–17.7)
HGB UR QL STRIP.AUTO: ABNORMAL
HYALINE CASTS UR QL AUTO: NORMAL /LPF
IMM GRANULOCYTES # BLD AUTO: 0 10*3/MM3 (ref 0–0.05)
IMM GRANULOCYTES NFR BLD AUTO: 0 % (ref 0–0.5)
KETONES UR QL STRIP: NEGATIVE
LDLC SERPL CALC-MCNC: 130 MG/DL (ref 0–100)
LEUKOCYTE ESTERASE UR QL STRIP.AUTO: NEGATIVE
LYMPHOCYTES # BLD AUTO: 1.02 10*3/MM3 (ref 0.7–3.1)
LYMPHOCYTES NFR BLD AUTO: 24.1 % (ref 19.6–45.3)
MCH RBC QN AUTO: 30.2 PG (ref 26.6–33)
MCHC RBC AUTO-ENTMCNC: 33.8 G/DL (ref 31.5–35.7)
MCV RBC AUTO: 89.3 FL (ref 79–97)
MONOCYTES # BLD AUTO: 0.48 10*3/MM3 (ref 0.1–0.9)
MONOCYTES NFR BLD AUTO: 11.3 % (ref 5–12)
NEUTROPHILS NFR BLD AUTO: 2.53 10*3/MM3 (ref 1.7–7)
NEUTROPHILS NFR BLD AUTO: 59.6 % (ref 42.7–76)
NITRITE UR QL STRIP: NEGATIVE
NRBC BLD AUTO-RTO: 0 /100 WBC (ref 0–0.2)
PH UR STRIP.AUTO: 6.5 [PH] (ref 5–8)
PLATELET # BLD AUTO: 289 10*3/MM3 (ref 140–450)
PMV BLD AUTO: 8.9 FL (ref 6–12)
POTASSIUM SERPL-SCNC: 3.9 MMOL/L (ref 3.5–5.2)
PROT SERPL-MCNC: 6.8 G/DL (ref 6–8.5)
PROT UR QL STRIP: NEGATIVE
PSA SERPL-MCNC: 0.94 NG/ML (ref 0–4)
RBC # BLD AUTO: 4.6 10*6/MM3 (ref 4.14–5.8)
RBC # UR STRIP: NORMAL /HPF
REF LAB TEST METHOD: NORMAL
SODIUM SERPL-SCNC: 139 MMOL/L (ref 136–145)
SP GR UR STRIP: 1.02 (ref 1–1.03)
SQUAMOUS #/AREA URNS HPF: NORMAL /HPF
TRIGL SERPL-MCNC: 58 MG/DL (ref 0–150)
UROBILINOGEN UR QL STRIP: ABNORMAL
VLDLC SERPL-MCNC: 10 MG/DL (ref 5–40)
WBC # UR STRIP: NORMAL /HPF
WBC NRBC COR # BLD AUTO: 4.24 10*3/MM3 (ref 3.4–10.8)

## 2025-02-13 PROCEDURE — 84403 ASSAY OF TOTAL TESTOSTERONE: CPT | Performed by: FAMILY MEDICINE

## 2025-02-13 PROCEDURE — 84402 ASSAY OF FREE TESTOSTERONE: CPT | Performed by: FAMILY MEDICINE

## 2025-02-13 PROCEDURE — 86803 HEPATITIS C AB TEST: CPT | Performed by: FAMILY MEDICINE

## 2025-02-13 PROCEDURE — 36415 COLL VENOUS BLD VENIPUNCTURE: CPT | Performed by: FAMILY MEDICINE

## 2025-02-13 PROCEDURE — 80061 LIPID PANEL: CPT | Performed by: FAMILY MEDICINE

## 2025-02-13 PROCEDURE — 86141 C-REACTIVE PROTEIN HS: CPT | Performed by: FAMILY MEDICINE

## 2025-02-13 PROCEDURE — 81001 URINALYSIS AUTO W/SCOPE: CPT | Performed by: FAMILY MEDICINE

## 2025-02-13 PROCEDURE — G0103 PSA SCREENING: HCPCS | Performed by: FAMILY MEDICINE

## 2025-02-13 PROCEDURE — 83036 HEMOGLOBIN GLYCOSYLATED A1C: CPT | Performed by: FAMILY MEDICINE

## 2025-02-13 PROCEDURE — 80050 GENERAL HEALTH PANEL: CPT | Performed by: FAMILY MEDICINE

## 2025-02-13 PROCEDURE — 99396 PREV VISIT EST AGE 40-64: CPT | Performed by: FAMILY MEDICINE

## 2025-02-13 PROCEDURE — 86787 VARICELLA-ZOSTER ANTIBODY: CPT | Performed by: FAMILY MEDICINE

## 2025-02-13 NOTE — PROGRESS NOTES
"Rickey Bedoya is here today for an annual physical exam.     History of Present Illness  The patient is here for a routine annual physical exam.    He feels well, with improved mood and reduced joint pain on a ketogenic/carnivore diet. He is concerned about long-term cardiovascular risk and continues routine exercise.    Little interest or pleasure in doing things? Not at all   Feeling down, depressed, or hopeless? Not at all   PHQ-2 Total Score 0          Health Maintenance   Topic Date Due    HEPATITIS C SCREENING  Never done    Pneumococcal Vaccine 50+ (1 of 1 - PCV) Never done    ZOSTER VACCINE (1 of 2) Never done    COVID-19 Vaccine (3 - 2024-25 season) 09/01/2024    ANNUAL PHYSICAL  02/05/2025    BMI FOLLOWUP  02/05/2025    LUNG CANCER SCREENING  02/22/2025    TDAP/TD VACCINES (2 - Td or Tdap) 08/22/2026    COLORECTAL CANCER SCREENING  03/28/2032    INFLUENZA VACCINE  Completed       Review of systems was performed, and pertinent findings are noted in the HPI.    /80 (BP Location: Left arm, Patient Position: Sitting, Cuff Size: Large Adult)   Pulse 61   Temp 97.2 °F (36.2 °C) (Temporal)   Ht 188 cm (74\")   Wt 107 kg (236 lb)   SpO2 95%   BMI 30.30 kg/m²      BMI is >= 30 and <35. (Class 1 Obesity). The following options were offered after discussion;: exercise counseling/recommendations and nutrition counseling/recommendations        Physical Exam    Vital signs reviewed.  General appearance: No acute distress  Eyes: conjunctiva clear without erythema; pupils equally round and reactive  ENT: external ears normal; hearing normal  Neck: supple; no thyromegaly  CV: normal rate and rhythm; no peripheral edema  Respiratory: normal respiratory effort; lungs clear to auscultation bilaterally  MSK: normal gait and station; no focal joint deformity or swelling  Skin: no rash or wounds; normal turgor  Neuro: cranial nerves 2-12 grossly intact; normal sensation to light touch  Psych: mood and affect normal; " recent and remote memory intact      Current Outpatient Medications:     cyclobenzaprine (FLEXERIL) 5 MG tablet, Take 1-2 tabs up to TID prn muscle spasm, Disp: 30 tablet, Rfl: 1    fluticasone (FLONASE) 50 MCG/ACT nasal spray, 2 sprays by Each Nare route Daily., Disp: 16 mL, Rfl: 11    Diagnoses and all orders for this visit:    1. Annual physical exam (Primary)  -     CBC & Differential  -     Comprehensive Metabolic Panel  -     Lipid Panel With / Chol / HDL Ratio  -     Thyroid Cascade Profile  -     Urinalysis With Culture If Indicated -  -     Hemoglobin A1c  -     PSA Screen  -     High Sensitivity CRP  -     CT chest low dose wo; Future  -     Testosterone, Free, Total  -     Hepatitis C Antibody  -     Varicella zoster antibody, IgG    2. Screening for prostate cancer  -     PSA Screen    3. Screening for lung cancer  -     CT chest low dose wo; Future    4. Former smoker  -     CT chest low dose wo; Future        Assessment & Plan  1. Annual physical examination.  He is overall doing well. Discussed screening, dietary methods, and cardiovascular risk. Noted coronary artery calcifications on last year's lung cancer screening. Labs as noted. Will check varicella titers for possible shingles vaccination.    Patient or patient representative verbalized consent for the use of Ambient Listening during the visit with  Chi Cortez MD for chart documentation. 2/13/2025  09:16 EST  *Dictated after leaving exam room.   Chi Cortez MD   09:16 EST   02/13/25

## 2025-02-14 LAB
TSH SERPL DL<=0.005 MIU/L-ACNC: 0.98 UIU/ML (ref 0.45–4.5)
VZV IGG SER QL IA: REACTIVE

## 2025-02-19 LAB
TESTOST FREE SERPL-MCNC: 15.8 PG/ML (ref 7.2–24)
TESTOST SERPL-MCNC: 962 NG/DL (ref 264–916)

## 2025-02-25 ENCOUNTER — HOSPITAL ENCOUNTER (OUTPATIENT)
Dept: CT IMAGING | Facility: HOSPITAL | Age: 55
Discharge: HOME | End: 2025-02-25
Admitting: FAMILY MEDICINE
Payer: COMMERCIAL

## 2025-02-25 DIAGNOSIS — Z87.891 FORMER SMOKER: ICD-10-CM

## 2025-02-25 DIAGNOSIS — Z00.00 ANNUAL PHYSICAL EXAM: ICD-10-CM

## 2025-02-25 DIAGNOSIS — Z12.2 SCREENING FOR LUNG CANCER: ICD-10-CM

## 2025-02-25 PROCEDURE — 71271 CT THORAX LUNG CANCER SCR C-: CPT

## 2025-02-28 RX ORDER — FLUTICASONE PROPIONATE 50 MCG
SPRAY, SUSPENSION (ML) NASAL
Qty: 16 G | Refills: 5 | Status: SHIPPED | OUTPATIENT
Start: 2025-02-28

## (undated) DEVICE — KT ORCA ORCAPOD DISP STRL

## (undated) DEVICE — TUBING, SUCTION, 1/4" X 10', STRAIGHT: Brand: MEDLINE

## (undated) DEVICE — LN SMPL CO2 SHTRM SD STREAM W/M LUER

## (undated) DEVICE — ADAPT CLN BIOGUARD AIR/H2O DISP

## (undated) DEVICE — CANN O2 ETCO2 FITS ALL CONN CO2 SMPL A/ 7IN DISP LF

## (undated) DEVICE — SENSR O2 OXIMAX FNGR A/ 18IN NONSTR